# Patient Record
Sex: FEMALE | Race: WHITE | NOT HISPANIC OR LATINO
[De-identification: names, ages, dates, MRNs, and addresses within clinical notes are randomized per-mention and may not be internally consistent; named-entity substitution may affect disease eponyms.]

---

## 2017-01-31 ENCOUNTER — RESULT REVIEW (OUTPATIENT)
Age: 57
End: 2017-01-31

## 2017-02-14 ENCOUNTER — APPOINTMENT (OUTPATIENT)
Dept: OBGYN | Facility: CLINIC | Age: 57
End: 2017-02-14

## 2017-03-16 ENCOUNTER — APPOINTMENT (OUTPATIENT)
Dept: PULMONOLOGY | Facility: CLINIC | Age: 57
End: 2017-03-16

## 2017-03-16 VITALS
HEIGHT: 65 IN | BODY MASS INDEX: 31.16 KG/M2 | TEMPERATURE: 97.9 F | HEART RATE: 95 BPM | OXYGEN SATURATION: 96 % | WEIGHT: 187 LBS | RESPIRATION RATE: 16 BRPM

## 2017-05-18 ENCOUNTER — OUTPATIENT (OUTPATIENT)
Dept: OUTPATIENT SERVICES | Facility: HOSPITAL | Age: 57
LOS: 1 days | End: 2017-05-18
Payer: COMMERCIAL

## 2017-05-18 ENCOUNTER — APPOINTMENT (OUTPATIENT)
Dept: SLEEP CENTER | Facility: CLINIC | Age: 57
End: 2017-05-18

## 2017-05-18 PROCEDURE — 95810 POLYSOM 6/> YRS 4/> PARAM: CPT

## 2017-05-19 DIAGNOSIS — G47.33 OBSTRUCTIVE SLEEP APNEA (ADULT) (PEDIATRIC): ICD-10-CM

## 2017-06-08 ENCOUNTER — RESULT REVIEW (OUTPATIENT)
Age: 57
End: 2017-06-08

## 2017-06-16 ENCOUNTER — APPOINTMENT (OUTPATIENT)
Dept: PULMONOLOGY | Facility: CLINIC | Age: 57
End: 2017-06-16

## 2017-06-16 VITALS
RESPIRATION RATE: 14 BRPM | TEMPERATURE: 97.8 F | HEART RATE: 87 BPM | HEIGHT: 65 IN | WEIGHT: 196 LBS | OXYGEN SATURATION: 97 % | BODY MASS INDEX: 32.65 KG/M2

## 2017-06-16 DIAGNOSIS — F51.12 INSUFFICIENT SLEEP SYNDROME: ICD-10-CM

## 2017-06-16 DIAGNOSIS — R06.83 SNORING: ICD-10-CM

## 2017-06-16 DIAGNOSIS — F19.982 OTHER PSYCHOACTIVE SUBSTANCE USE, UNSPECIFIED WITH PSYCHOACTIVE SUBSTANCE-INDUCED SLEEP DISORDER: ICD-10-CM

## 2017-06-16 RX ORDER — DOXYCYCLINE HYCLATE 100 MG/1
100 TABLET, DELAYED RELEASE ORAL
Qty: 14 | Refills: 0 | Status: COMPLETED | COMMUNITY
Start: 2017-04-10

## 2017-06-16 RX ORDER — ROSUVASTATIN CALCIUM 20 MG/1
20 TABLET, FILM COATED ORAL
Qty: 90 | Refills: 0 | Status: ACTIVE | COMMUNITY
Start: 2017-04-10

## 2017-06-16 RX ORDER — RAMIPRIL 10 MG/1
10 CAPSULE ORAL
Qty: 90 | Refills: 0 | Status: ACTIVE | COMMUNITY
Start: 2017-04-10

## 2017-06-16 RX ORDER — CLINDAMYCIN HYDROCHLORIDE 300 MG/1
300 CAPSULE ORAL
Qty: 28 | Refills: 0 | Status: COMPLETED | COMMUNITY
Start: 2017-03-25

## 2017-06-16 RX ORDER — OXYCODONE 5 MG/1
5 TABLET ORAL
Qty: 12 | Refills: 0 | Status: COMPLETED | COMMUNITY
Start: 2017-03-25

## 2017-11-15 ENCOUNTER — OUTPATIENT (OUTPATIENT)
Dept: OUTPATIENT SERVICES | Facility: HOSPITAL | Age: 57
LOS: 1 days | End: 2017-11-15
Payer: COMMERCIAL

## 2017-11-15 ENCOUNTER — APPOINTMENT (OUTPATIENT)
Dept: RADIOLOGY | Facility: CLINIC | Age: 57
End: 2017-11-15
Payer: COMMERCIAL

## 2017-11-15 DIAGNOSIS — Z00.8 ENCOUNTER FOR OTHER GENERAL EXAMINATION: ICD-10-CM

## 2017-11-15 PROCEDURE — 73562 X-RAY EXAM OF KNEE 3: CPT | Mod: 26,50

## 2017-11-15 PROCEDURE — 73562 X-RAY EXAM OF KNEE 3: CPT

## 2018-10-29 ENCOUNTER — APPOINTMENT (OUTPATIENT)
Dept: ALLERGY | Facility: CLINIC | Age: 58
End: 2018-10-29
Payer: COMMERCIAL

## 2018-10-29 VITALS
SYSTOLIC BLOOD PRESSURE: 140 MMHG | HEIGHT: 65 IN | RESPIRATION RATE: 16 BRPM | DIASTOLIC BLOOD PRESSURE: 82 MMHG | BODY MASS INDEX: 30.82 KG/M2 | WEIGHT: 185 LBS | HEART RATE: 80 BPM

## 2018-10-29 PROCEDURE — 99204 OFFICE O/P NEW MOD 45 MIN: CPT | Mod: 25

## 2018-10-29 RX ORDER — LORATADINE 10 MG/1
TABLET ORAL
Refills: 0 | Status: DISCONTINUED | COMMUNITY
End: 2018-10-29

## 2018-10-29 RX ORDER — MUPIROCIN 2 G/100G
2 CREAM TOPICAL
Qty: 22 | Refills: 0 | Status: DISCONTINUED | COMMUNITY
Start: 2017-04-10 | End: 2018-10-29

## 2018-10-31 ENCOUNTER — OTHER (OUTPATIENT)
Age: 58
End: 2018-10-31

## 2018-11-01 PROBLEM — Z88.1 DRUG ALLERGY, ANTIBIOTIC: Status: ACTIVE | Noted: 2018-11-01

## 2018-11-04 DIAGNOSIS — Z88.1 ALLERGY STATUS TO OTHER ANTIBIOTIC AGENTS: ICD-10-CM

## 2018-11-06 ENCOUNTER — APPOINTMENT (OUTPATIENT)
Dept: ALLERGY | Facility: CLINIC | Age: 58
End: 2018-11-06
Payer: COMMERCIAL

## 2018-11-06 PROCEDURE — 95076 INGEST CHALLENGE INI 120 MIN: CPT

## 2018-11-27 ENCOUNTER — APPOINTMENT (OUTPATIENT)
Dept: ALLERGY | Facility: CLINIC | Age: 58
End: 2018-11-27
Payer: COMMERCIAL

## 2018-11-27 PROCEDURE — 95076 INGEST CHALLENGE INI 120 MIN: CPT

## 2018-12-03 ENCOUNTER — OTHER (OUTPATIENT)
Age: 58
End: 2018-12-03

## 2018-12-06 ENCOUNTER — APPOINTMENT (OUTPATIENT)
Dept: ALLERGY | Facility: CLINIC | Age: 58
End: 2018-12-06
Payer: COMMERCIAL

## 2018-12-06 PROCEDURE — 95076 INGEST CHALLENGE INI 120 MIN: CPT

## 2019-09-25 ENCOUNTER — RESULT REVIEW (OUTPATIENT)
Age: 59
End: 2019-09-25

## 2020-11-04 ENCOUNTER — APPOINTMENT (OUTPATIENT)
Dept: ORTHOPEDIC SURGERY | Facility: CLINIC | Age: 60
End: 2020-11-04
Payer: COMMERCIAL

## 2020-11-04 VITALS
BODY MASS INDEX: 28.66 KG/M2 | DIASTOLIC BLOOD PRESSURE: 84 MMHG | SYSTOLIC BLOOD PRESSURE: 150 MMHG | HEART RATE: 90 BPM | WEIGHT: 172 LBS | HEIGHT: 65 IN

## 2020-11-04 VITALS — TEMPERATURE: 96.5 F

## 2020-11-04 DIAGNOSIS — G47.33 OBSTRUCTIVE SLEEP APNEA (ADULT) (PEDIATRIC): ICD-10-CM

## 2020-11-04 DIAGNOSIS — Z86.69 PERSONAL HISTORY OF OTHER DISEASES OF THE NERVOUS SYSTEM AND SENSE ORGANS: ICD-10-CM

## 2020-11-04 PROCEDURE — 99072 ADDL SUPL MATRL&STAF TM PHE: CPT

## 2020-11-04 PROCEDURE — 99204 OFFICE O/P NEW MOD 45 MIN: CPT

## 2020-11-04 PROCEDURE — 73564 X-RAY EXAM KNEE 4 OR MORE: CPT | Mod: RT

## 2020-12-03 ENCOUNTER — OUTPATIENT (OUTPATIENT)
Dept: OUTPATIENT SERVICES | Facility: HOSPITAL | Age: 60
LOS: 1 days | Discharge: HOME | End: 2020-12-03

## 2020-12-03 DIAGNOSIS — M17.12 UNILATERAL PRIMARY OSTEOARTHRITIS, LEFT KNEE: ICD-10-CM

## 2020-12-03 DIAGNOSIS — Z01.818 ENCOUNTER FOR OTHER PREPROCEDURAL EXAMINATION: ICD-10-CM

## 2020-12-03 DIAGNOSIS — Z91.89 OTHER SPECIFIED PERSONAL RISK FACTORS, NOT ELSEWHERE CLASSIFIED: ICD-10-CM

## 2020-12-03 DIAGNOSIS — Z90.49 ACQUIRED ABSENCE OF OTHER SPECIFIED PARTS OF DIGESTIVE TRACT: Chronic | ICD-10-CM

## 2020-12-03 DIAGNOSIS — Z90.13 ACQUIRED ABSENCE OF BILATERAL BREASTS AND NIPPLES: Chronic | ICD-10-CM

## 2020-12-03 DIAGNOSIS — Z90.89 ACQUIRED ABSENCE OF OTHER ORGANS: Chronic | ICD-10-CM

## 2020-12-03 DIAGNOSIS — Z98.890 OTHER SPECIFIED POSTPROCEDURAL STATES: Chronic | ICD-10-CM

## 2020-12-03 LAB
A1C WITH ESTIMATED AVERAGE GLUCOSE RESULT: 5.8 % — HIGH (ref 4–5.6)
ANION GAP SERPL CALC-SCNC: 5 MMOL/L — SIGNIFICANT CHANGE UP (ref 5–17)
APTT BLD: 35.3 SEC — SIGNIFICANT CHANGE UP (ref 27.5–35.5)
BLD GP AB SCN SERPL QL: SIGNIFICANT CHANGE UP
BUN SERPL-MCNC: 29 MG/DL — HIGH (ref 7–23)
CALCIUM SERPL-MCNC: 9.4 MG/DL — SIGNIFICANT CHANGE UP (ref 8.5–10.1)
CHLORIDE SERPL-SCNC: 111 MMOL/L — HIGH (ref 96–108)
CO2 SERPL-SCNC: 28 MMOL/L — SIGNIFICANT CHANGE UP (ref 22–31)
CREAT SERPL-MCNC: 0.78 MG/DL — SIGNIFICANT CHANGE UP (ref 0.5–1.3)
ESTIMATED AVERAGE GLUCOSE: 120 MG/DL — HIGH (ref 68–114)
GLUCOSE SERPL-MCNC: 114 MG/DL — HIGH (ref 70–99)
HCT VFR BLD CALC: 45 % — SIGNIFICANT CHANGE UP (ref 34.5–45)
HGB BLD-MCNC: 14 G/DL — SIGNIFICANT CHANGE UP (ref 11.5–15.5)
INR BLD: 1.03 RATIO — SIGNIFICANT CHANGE UP (ref 0.88–1.16)
MCHC RBC-ENTMCNC: 27.2 PG — SIGNIFICANT CHANGE UP (ref 27–34)
MCHC RBC-ENTMCNC: 31.1 GM/DL — LOW (ref 32–36)
MCV RBC AUTO: 87.4 FL — SIGNIFICANT CHANGE UP (ref 80–100)
MRSA PCR RESULT.: SIGNIFICANT CHANGE UP
NRBC # BLD: 0 /100 WBCS — SIGNIFICANT CHANGE UP (ref 0–0)
PLATELET # BLD AUTO: 265 K/UL — SIGNIFICANT CHANGE UP (ref 150–400)
POTASSIUM SERPL-MCNC: 4.7 MMOL/L — SIGNIFICANT CHANGE UP (ref 3.5–5.3)
POTASSIUM SERPL-SCNC: 4.7 MMOL/L — SIGNIFICANT CHANGE UP (ref 3.5–5.3)
PROTHROM AB SERPL-ACNC: 11.9 SEC — SIGNIFICANT CHANGE UP (ref 10.6–13.6)
RBC # BLD: 5.15 M/UL — SIGNIFICANT CHANGE UP (ref 3.8–5.2)
RBC # FLD: 14.7 % — HIGH (ref 10.3–14.5)
S AUREUS DNA NOSE QL NAA+PROBE: SIGNIFICANT CHANGE UP
SODIUM SERPL-SCNC: 144 MMOL/L — SIGNIFICANT CHANGE UP (ref 135–145)
WBC # BLD: 6.99 K/UL — SIGNIFICANT CHANGE UP (ref 3.8–10.5)
WBC # FLD AUTO: 6.99 K/UL — SIGNIFICANT CHANGE UP (ref 3.8–10.5)

## 2020-12-03 RX ORDER — SODIUM CHLORIDE 9 MG/ML
3 INJECTION INTRAMUSCULAR; INTRAVENOUS; SUBCUTANEOUS EVERY 8 HOURS
Refills: 0 | Status: DISCONTINUED | OUTPATIENT
Start: 2020-12-18 | End: 2020-12-19

## 2020-12-03 NOTE — PHYSICAL THERAPY INITIAL EVALUATION ADULT - PERTINENT HX OF CURRENT PROBLEM, REHAB EVAL
Pt with chronic pain, instability, and reduced functional mobility in L knee. Pt is scheduled for elective total joint replacement on 12/18/20  by  Dr. Antonio

## 2020-12-03 NOTE — PHYSICAL THERAPY INITIAL EVALUATION ADULT - OCCUPATION
[employer].  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

## 2020-12-03 NOTE — H&P PST ADULT - HISTORY OF PRESENT ILLNESS
Pt is a 60 year old female presenting for knee pain.    Patient denies fever, chills, SOB, recent travel, and recent sick contacts. Pt is a 60 year old female presenting for left knee pain.  Pt reports pain is 10/10 with activity with stiffness.  Jaydon numbness or tingling.  Pt is scheduled for left total knee arthroplasty on 12/18/2020.    Patient denies fever, chills, SOB, recent travel, and recent sick contacts.

## 2020-12-03 NOTE — H&P PST ADULT - MUSCULOSKELETAL
details… detailed exam Left knee/decreased ROM/decreased ROM due to pain/joint swelling/diminished strength
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
## 2020-12-03 NOTE — PHYSICAL THERAPY INITIAL EVALUATION ADULT - TINETTI GAIT TEST, REHAB EVAL
Indication of gait - 1/1,   Step Length and height - 1/2,   Foot Clearance - 2/2,   Step Symmetry - 0/1,  Step Continuity - 1/1,   Path - 1/ 2,   Trunk - 1/2,   Walking Time - 0/1,   Total Score - 7/12

## 2020-12-03 NOTE — PHYSICAL THERAPY INITIAL EVALUATION ADULT - ADDITIONAL COMMENTS
Pt lives in a private home c 2 steps, no rails, then 5 steps Alcides rails far apart and platform step at the top that can fit a RW to enter the house. Inside pt has 5 steps to negotiate with L rail up. BR has tub-shower, grab bar, retractable shower head, standard toilet seat height that can fit 3:1 commode. 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 Average pain level at rest is 1/10. Pain increases to 10/10 c sit to stand transfer, prolonged standing, ambulation, and stairs negotiation. Pt denies taking pain meds and denies use of ice/heat. Denies h/o adverse reactions to pain meds. Pt is currently not receiving outpatient PT, denies h/o falls, reports infrequent buckling in the knee. Wears eyeglasses for reading and distance, is R handed, currently drives, denies use of hearing aids. 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 Pt lives in a private home c 2 small steps, no rails but can fit a rolling walker (Pt reported that she was able to safelt negotiate the 2 small steps c the rolling walker I), then 5 steps Alcides rails far apart and a platform step at the top that can fit a RW to enter the house. Inside pt has 5 steps to negotiate with L rail up. BR has tub-shower, grab bar, retractable shower head, standard toilet seat height that can fit 3:1 commode. 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 Average pain level at rest is 1/10. Pain increases to 10/10 c sit to stand transfer, prolonged standing, ambulation, and stairs negotiation. Pt denies taking pain meds and denies use of ice/heat. Denies h/o adverse reactions to pain meds. Pt is currently not receiving outpatient PT, denies h/o falls, reports infrequent buckling in the knee. Wears eyeglasses for reading and distance, is R handed, currently drives, denies use of hearing aids.

## 2020-12-03 NOTE — H&P PST ADULT - ASSESSMENT
CAPRINI SCORE [CLOT]    AGE RELATED RISK FACTORS                                                       MOBILITY RELATED FACTORS  [ ] Age 41-60 years                                            (1 Point)                  [ ] Bed rest                                                        (1 Point)  [ ] Age: 61-74 years                                           (2 Points)                 [ ] Plaster cast                                                   (2 Points)  [ ] Age= 75 years                                              (3 Points)                 [ ] Bed bound for more than 72 hours                 (2 Points)    DISEASE RELATED RISK FACTORS                                               GENDER SPECIFIC FACTORS  [ ] Edema in the lower extremities                       (1 Point)                  [ ] Pregnancy                                                     (1 Point)  [ ] Varicose veins                                               (1 Point)                  [ ] Post-partum < 6 weeks                                   (1 Point)             [ ] BMI > 25 Kg/m2                                            (1 Point)                  [ ] Hormonal therapy  or oral contraception          (1 Point)                 [ ] Sepsis (in the previous month)                        (1 Point)                  [ ] History of pregnancy complications                 (1 point)  [ ] Pneumonia or serious lung disease                                               [ ] Unexplained or recurrent                     (1 Point)           (in the previous month)                               (1 Point)  [ ] Abnormal pulmonary function test                     (1 Point)                 SURGERY RELATED RISK FACTORS  [ ] Acute myocardial infarction                              (1 Point)                 [ ]  Section                                             (1 Point)  [ ] Congestive heart failure (in the previous month)  (1 Point)               [ ] Minor surgery                                                  (1 Point)   [ ] Inflammatory bowel disease                             (1 Point)                 [ ] Arthroscopic surgery                                        (2 Points)  [ ] Central venous access                                      (2 Points)                [ ] General surgery lasting more than 45 minutes   (2 Points)       [ ] Stroke (in the previous month)                          (5 Points)               [ ] Elective arthroplasty                                         (5 Points)                                                                                                                                               HEMATOLOGY RELATED FACTORS                                                 TRAUMA RELATED RISK FACTORS  [ ] Prior episodes of VTE                                     (3 Points)                [ ] Fracture of the hip, pelvis, or leg                       (5 Points)  [ ] Positive family history for VTE                         (3 Points)                 [ ] Acute spinal cord injury (in the previous month)  (5 Points)  [ ] Prothrombin 07401 A                                     (3 Points)                 [ ] Paralysis  (less than 1 month)                             (5 Points)  [ ] Factor V Leiden                                             (3 Points)                  [ ] Multiple Trauma within 1 month                        (5 Points)  [ ] Lupus anticoagulants                                     (3 Points)                                                           [ ] Anticardiolipin antibodies                               (3 Points)                                                       [ ] High homocysteine in the blood                      (3 Points)                                             [ ] Other congenital or acquired thrombophilia      (3 Points)                                                [ ] Heparin induced thrombocytopenia                  (3 Points)                                          Total Score [          ]    Caprini Score 0 - 2:  Low Risk, No VTE Prophylaxis required for most patients, encourage ambulation  Caprini Score 3 - 6:  At Risk, pharmacologic VTE prophylaxis is indicated for most patients (in the absence of a contraindication)  Caprini Score Greater than or = 7:  High Risk, pharmacologic VTE prophylaxis is indicated for most patients (in the absence of a contraindication)      Preoperative examination  Assessment and Plan: labs - CBC, BMP, PT/INR,  aPTT, T & S, HgA1C, EKG, PT/ OT consult  Preop 3 day Hibiclens instruction reviewed and given.  Instructed on if nose cx is positive to use mupuricin for 5 days and checklist given.   Patient instructed of NPO status and to take routine meds DOS with sips of water.   Avoid NSAIDs, aspirin, herbal products, OTC supplements.  Patient verbalized understanding.  Information on proper nutrition, increase protein and better food choices provided in packet.  Patient to go for COVID swabbing at 3 days prior to surgery.  List of location and contact information provided.  Patient to schedule an appointment with Dr. Elpidio Avilez for medical clearance. Pt is a 60 year old female presenting for left knee pain.  Pt reports pain is 10/10 with activity with stiffness.  Jaydon numbness or tingling.    Plan: Left total knee arthroplasty on 2020.    CAPRINI SCORE [CLOT]    AGE RELATED RISK FACTORS                                                       MOBILITY RELATED FACTORS  [ ] Age 41-60 years                                            (1 Point)                  [ ] Bed rest                                                        (1 Point)  [x ] Age: 61-74 years                                           (2 Points)                 [ ] Plaster cast                                                   (2 Points)  [ ] Age= 75 years                                              (3 Points)                 [ ] Bed bound for more than 72 hours                 (2 Points)    DISEASE RELATED RISK FACTORS                                               GENDER SPECIFIC FACTORS  [ ] Edema in the lower extremities                       (1 Point)                  [ ] Pregnancy                                                     (1 Point)  [ ] Varicose veins                                               (1 Point)                  [ ] Post-partum < 6 weeks                                   (1 Point)             [x ] BMI > 25 Kg/m2                                            (1 Point)                  [ ] Hormonal therapy  or oral contraception          (1 Point)                 [ ] Sepsis (in the previous month)                        (1 Point)                  [ ] History of pregnancy complications                 (1 point)  [ ] Pneumonia or serious lung disease                                               [ ] Unexplained or recurrent                     (1 Point)           (in the previous month)                               (1 Point)  [ ] Abnormal pulmonary function test                     (1 Point)                 SURGERY RELATED RISK FACTORS  [ ] Acute myocardial infarction                              (1 Point)                 [ ]  Section                                             (1 Point)  [ ] Congestive heart failure (in the previous month)  (1 Point)               [ ] Minor surgery                                                  (1 Point)   [ ] Inflammatory bowel disease                             (1 Point)                 [ ] Arthroscopic surgery                                        (2 Points)  [ ] Central venous access                                      (2 Points)                [ ] General surgery lasting more than 45 minutes   (2 Points)       [ ] Stroke (in the previous month)                          (5 Points)               [x ] Elective arthroplasty                                         (5 Points)                                                                                                                                               HEMATOLOGY RELATED FACTORS                                                 TRAUMA RELATED RISK FACTORS  [ ] Prior episodes of VTE                                     (3 Points)                [ ] Fracture of the hip, pelvis, or leg                       (5 Points)  [ ] Positive family history for VTE                         (3 Points)                 [ ] Acute spinal cord injury (in the previous month)  (5 Points)  [ ] Prothrombin 52898 A                                     (3 Points)                 [ ] Paralysis  (less than 1 month)                             (5 Points)  [ ] Factor V Leiden                                             (3 Points)                  [ ] Multiple Trauma within 1 month                        (5 Points)  [ ] Lupus anticoagulants                                     (3 Points)                                                           [ ] Anticardiolipin antibodies                               (3 Points)                                                       [ ] High homocysteine in the blood                      (3 Points)                                             [ ] Other congenital or acquired thrombophilia      (3 Points)                                                [ ] Heparin induced thrombocytopenia                  (3 Points)                                          Total Score [    8     ]    Caprini Score 0 - 2:  Low Risk, No VTE Prophylaxis required for most patients, encourage ambulation  Caprini Score 3 - 6:  At Risk, pharmacologic VTE prophylaxis is indicated for most patients (in the absence of a contraindication)  Caprini Score Greater than or = 7:  High Risk, pharmacologic VTE prophylaxis is indicated for most patients (in the absence of a contraindication)    Caprini score indicates that the patient is high risk for VTE event ( score 6 or greater). Surgical patient's in this group will benefit from both pharmacologic prophylaxis and intermittent compression devices . Surgical team will determine the balance between VTE  risk and bleeding risk and other clinical considerations       Preoperative examination  Assessment and Plan: labs - CBC, BMP, PT/INR,  aPTT, T & S, HgA1C, EKG, PT/ OT consult  Preop 3 day Hibiclens instruction reviewed and given.  Instructed on if nose cx is positive to use mupuricin for 5 days and checklist given.   Patient instructed of NPO status and to take routine meds DOS with sips of water.   Avoid NSAIDs, aspirin, herbal products, OTC supplements.  Patient verbalized understanding.  Information on proper nutrition, increase protein and better food choices provided in packet.  Patient to go for COVID swabbing at 3 days prior to surgery.  List of location and contact information provided.  Patient to schedule an appointment with Dr. Elpidio Avilez for medical clearance.

## 2020-12-03 NOTE — H&P PST ADULT - NSICDXPASTMEDICALHX_GEN_ALL_CORE_FT
PAST MEDICAL HISTORY:  Lymphedema      PAST MEDICAL HISTORY:  Breast CA     HTN (hypertension)     Lymphedema      PAST MEDICAL HISTORY:  Breast CA     High cholesterol     HTN (hypertension)     Lymphedema

## 2020-12-03 NOTE — H&P PST ADULT - NSICDXPROBLEM_GEN_ALL_CORE_FT
PROBLEM DIAGNOSES  Problem: Preop examination  Assessment and Plan:        PROBLEM DIAGNOSES  Problem: Primary osteoarthritis of left knee  Assessment and Plan: Left total knee arthroplasty    Problem: At high risk for venous thromboembolism (VTE)  Assessment and Plan: Caprini score indicates that the patient is high risk for VTE event ( score 6 or greater). Surgical patient's in this group will benefit from both pharmacologic prophylaxis and intermittent compression devices . Surgical team will determine the balance between VTE  risk and bleeding risk and other clinical considerations       Problem: Preop examination  Assessment and Plan:        PROBLEM DIAGNOSES  Problem: Primary osteoarthritis of left knee  Assessment and Plan: Left total knee arthroplasty    Problem: At high risk for venous thromboembolism (VTE)  Assessment and Plan: Caprini score indicates that the patient is high risk for VTE event ( score 6 or greater). Surgical patient's in this group will benefit from both pharmacologic prophylaxis and intermittent compression devices . Surgical team will determine the balance between VTE  risk and bleeding risk and other clinical considerations       Problem: Preop examination  Assessment and Plan: CBC, BMP, PT/INR,  aPTT, T & S, HgA1C, EKG, PT/ OT consult  Preop 3 day Hibiclens instruction reviewed and given.  Instructed on if nose cx is positive to use mupuricin for 5 days and checklist given.   Patient instructed of NPO status and to take routine meds DOS with sips of water.   Avoid NSAIDs, aspirin, herbal products, OTC supplements.  Patient verbalized understanding.  Information on proper nutrition, increase protein and better food choices provided in packet.  Patient to go for COVID swabbing at 3 days prior to surgery.  List of location and contact information provided.  Patient to schedule an appointment with Dr. Elpidio Avilez for medical clearance.

## 2020-12-03 NOTE — PHYSICAL THERAPY INITIAL EVALUATION ADULT - MUSCLE TONE ASSESSMENT, REHAB EVAL
Arthropathy left lower leg    Benign Heart Murmur    CA - Cancer of Uterus  endometrial  Cerebrospinal Meningitis    Closed Fracture Ankle, Bimalleolar  right ankle cast immobilization  DD (Diverticular Disease)    Diabetes Mellitus Type II    History of Hemorrhoidectomy    Hyperlipemia    Lumbar Spinal Stenosis    Obesity    Osteoarthritis of Lumbar Spine    Renal Calculus    Vitreous Detachment  right eye 1990    left eye 1995 normal

## 2020-12-03 NOTE — PHYSICAL THERAPY INITIAL EVALUATION ADULT - CRITERIA FOR SKILLED THERAPEUTIC INTERVENTIONS
impairments found/rehab potential/anticipated discharge recommendation/functional limitations in following categories/therapy frequency/risk reduction/prevention/anticipated equipment needs at discharge

## 2020-12-03 NOTE — PHYSICAL THERAPY INITIAL EVALUATION ADULT - TINETTI BALANCE TEST, REHAB EVAL
Sitting Balance - 1/1 , Rises From Chair - 1/2, Attempts to rise - 2/2 , Immediate Standing Balance - 1/2,  Standing Balance - 2/2, Nudged -  0/2, Eyes Closed - 0/1,  Turning 360 Deg -  1/2, Sitting down - 1/2, Balance Score - 8/16

## 2020-12-03 NOTE — PHYSICAL THERAPY INITIAL EVALUATION ADULT - IMPAIRMENTS FOUND, PT EVAL
joint integrity and mobility/posture/aerobic capacity/endurance/muscle strength/ROM/gait, locomotion, and balance

## 2020-12-03 NOTE — H&P PST ADULT - NSICDXPASTSURGICALHX_GEN_ALL_CORE_FT
PAST SURGICAL HISTORY:  H/O rhinoplasty     History of appendectomy     History of tonsillectomy      PAST SURGICAL HISTORY:  H/O bilateral mastectomy     H/O rhinoplasty     History of appendectomy     History of tonsillectomy

## 2020-12-03 NOTE — H&P PST ADULT - NSANTHOSAYNRD_GEN_A_CORE
No. SUBHA screening performed.  STOP BANG Legend: 0-2 = LOW Risk; 3-4 = INTERMEDIATE Risk; 5-8 = HIGH Risk Yes

## 2020-12-11 PROBLEM — I10 ESSENTIAL (PRIMARY) HYPERTENSION: Chronic | Status: ACTIVE | Noted: 2020-12-03

## 2020-12-12 ENCOUNTER — APPOINTMENT (OUTPATIENT)
Dept: CT IMAGING | Facility: IMAGING CENTER | Age: 60
End: 2020-12-12
Payer: COMMERCIAL

## 2020-12-12 ENCOUNTER — OUTPATIENT (OUTPATIENT)
Dept: OUTPATIENT SERVICES | Facility: HOSPITAL | Age: 60
LOS: 1 days | End: 2020-12-12
Payer: COMMERCIAL

## 2020-12-12 ENCOUNTER — RESULT REVIEW (OUTPATIENT)
Age: 60
End: 2020-12-12

## 2020-12-12 DIAGNOSIS — M17.12 UNILATERAL PRIMARY OSTEOARTHRITIS, LEFT KNEE: ICD-10-CM

## 2020-12-12 DIAGNOSIS — Z90.49 ACQUIRED ABSENCE OF OTHER SPECIFIED PARTS OF DIGESTIVE TRACT: Chronic | ICD-10-CM

## 2020-12-12 DIAGNOSIS — Z90.13 ACQUIRED ABSENCE OF BILATERAL BREASTS AND NIPPLES: Chronic | ICD-10-CM

## 2020-12-12 DIAGNOSIS — Z98.890 OTHER SPECIFIED POSTPROCEDURAL STATES: Chronic | ICD-10-CM

## 2020-12-12 DIAGNOSIS — Z90.89 ACQUIRED ABSENCE OF OTHER ORGANS: Chronic | ICD-10-CM

## 2020-12-12 PROCEDURE — 73700 CT LOWER EXTREMITY W/O DYE: CPT | Mod: 26,LT

## 2020-12-12 PROCEDURE — 73700 CT LOWER EXTREMITY W/O DYE: CPT

## 2020-12-15 ENCOUNTER — APPOINTMENT (OUTPATIENT)
Dept: DISASTER EMERGENCY | Facility: CLINIC | Age: 60
End: 2020-12-15

## 2020-12-17 ENCOUNTER — TRANSCRIPTION ENCOUNTER (OUTPATIENT)
Age: 60
End: 2020-12-17

## 2020-12-17 LAB — SARS-COV-2 N GENE NPH QL NAA+PROBE: NOT DETECTED

## 2020-12-18 ENCOUNTER — TRANSCRIPTION ENCOUNTER (OUTPATIENT)
Age: 60
End: 2020-12-18

## 2020-12-18 ENCOUNTER — APPOINTMENT (OUTPATIENT)
Dept: ORTHOPEDIC SURGERY | Facility: HOSPITAL | Age: 60
End: 2020-12-18

## 2020-12-18 ENCOUNTER — INPATIENT (INPATIENT)
Facility: HOSPITAL | Age: 60
LOS: 0 days | Discharge: HOME | End: 2020-12-19
Attending: ORTHOPAEDIC SURGERY | Admitting: ORTHOPAEDIC SURGERY
Payer: COMMERCIAL

## 2020-12-18 ENCOUNTER — RESULT REVIEW (OUTPATIENT)
Age: 60
End: 2020-12-18

## 2020-12-18 VITALS
DIASTOLIC BLOOD PRESSURE: 81 MMHG | OXYGEN SATURATION: 97 % | TEMPERATURE: 98 F | SYSTOLIC BLOOD PRESSURE: 142 MMHG | WEIGHT: 177.03 LBS | HEART RATE: 89 BPM | RESPIRATION RATE: 16 BRPM | HEIGHT: 65 IN

## 2020-12-18 DIAGNOSIS — Z90.89 ACQUIRED ABSENCE OF OTHER ORGANS: Chronic | ICD-10-CM

## 2020-12-18 DIAGNOSIS — Z98.890 OTHER SPECIFIED POSTPROCEDURAL STATES: Chronic | ICD-10-CM

## 2020-12-18 DIAGNOSIS — Z90.13 ACQUIRED ABSENCE OF BILATERAL BREASTS AND NIPPLES: Chronic | ICD-10-CM

## 2020-12-18 DIAGNOSIS — Z90.49 ACQUIRED ABSENCE OF OTHER SPECIFIED PARTS OF DIGESTIVE TRACT: Chronic | ICD-10-CM

## 2020-12-18 PROCEDURE — 88305 TISSUE EXAM BY PATHOLOGIST: CPT | Mod: 26

## 2020-12-18 PROCEDURE — 20985 CPTR-ASST DIR MS PX: CPT

## 2020-12-18 PROCEDURE — 73560 X-RAY EXAM OF KNEE 1 OR 2: CPT | Mod: 26,LT

## 2020-12-18 PROCEDURE — 27447 TOTAL KNEE ARTHROPLASTY: CPT | Mod: LT

## 2020-12-18 PROCEDURE — 88311 DECALCIFY TISSUE: CPT | Mod: 26

## 2020-12-18 RX ORDER — CEFAZOLIN SODIUM 1 G
2000 VIAL (EA) INJECTION EVERY 8 HOURS
Refills: 0 | Status: COMPLETED | OUTPATIENT
Start: 2020-12-18 | End: 2020-12-19

## 2020-12-18 RX ORDER — ONDANSETRON 8 MG/1
4 TABLET, FILM COATED ORAL EVERY 6 HOURS
Refills: 0 | Status: DISCONTINUED | OUTPATIENT
Start: 2020-12-18 | End: 2020-12-19

## 2020-12-18 RX ORDER — ACETAMINOPHEN 500 MG
100 TABLET ORAL ONCE
Refills: 0 | Status: DISCONTINUED | OUTPATIENT
Start: 2020-12-18 | End: 2020-12-19

## 2020-12-18 RX ORDER — SODIUM CHLORIDE 9 MG/ML
500 INJECTION, SOLUTION INTRAVENOUS ONCE
Refills: 0 | Status: COMPLETED | OUTPATIENT
Start: 2020-12-18 | End: 2020-12-18

## 2020-12-18 RX ORDER — FENTANYL CITRATE 50 UG/ML
25 INJECTION INTRAVENOUS
Refills: 0 | Status: DISCONTINUED | OUTPATIENT
Start: 2020-12-18 | End: 2020-12-18

## 2020-12-18 RX ORDER — OXYCODONE HYDROCHLORIDE 5 MG/1
10 TABLET ORAL
Refills: 0 | Status: DISCONTINUED | OUTPATIENT
Start: 2020-12-18 | End: 2020-12-19

## 2020-12-18 RX ORDER — SODIUM CHLORIDE 9 MG/ML
1000 INJECTION, SOLUTION INTRAVENOUS
Refills: 0 | Status: DISCONTINUED | OUTPATIENT
Start: 2020-12-18 | End: 2020-12-18

## 2020-12-18 RX ORDER — MAGNESIUM HYDROXIDE 400 MG/1
30 TABLET, CHEWABLE ORAL DAILY
Refills: 0 | Status: DISCONTINUED | OUTPATIENT
Start: 2020-12-18 | End: 2020-12-19

## 2020-12-18 RX ORDER — PANTOPRAZOLE SODIUM 20 MG/1
40 TABLET, DELAYED RELEASE ORAL
Refills: 0 | Status: DISCONTINUED | OUTPATIENT
Start: 2020-12-18 | End: 2020-12-19

## 2020-12-18 RX ORDER — ACETAMINOPHEN 500 MG
650 TABLET ORAL ONCE
Refills: 0 | Status: COMPLETED | OUTPATIENT
Start: 2020-12-18 | End: 2020-12-18

## 2020-12-18 RX ORDER — DEXAMETHASONE 0.5 MG/5ML
8 ELIXIR ORAL ONCE
Refills: 0 | Status: COMPLETED | OUTPATIENT
Start: 2020-12-19 | End: 2020-12-19

## 2020-12-18 RX ORDER — SODIUM CHLORIDE 9 MG/ML
500 INJECTION, SOLUTION INTRAVENOUS ONCE
Refills: 0 | Status: COMPLETED | OUTPATIENT
Start: 2020-12-19 | End: 2020-12-19

## 2020-12-18 RX ORDER — TRAMADOL HYDROCHLORIDE 50 MG/1
50 TABLET ORAL EVERY 6 HOURS
Refills: 0 | Status: DISCONTINUED | OUTPATIENT
Start: 2020-12-18 | End: 2020-12-19

## 2020-12-18 RX ORDER — HYDROMORPHONE HYDROCHLORIDE 2 MG/ML
0.5 INJECTION INTRAMUSCULAR; INTRAVENOUS; SUBCUTANEOUS ONCE
Refills: 0 | Status: DISCONTINUED | OUTPATIENT
Start: 2020-12-18 | End: 2020-12-19

## 2020-12-18 RX ORDER — SENNA PLUS 8.6 MG/1
2 TABLET ORAL AT BEDTIME
Refills: 0 | Status: DISCONTINUED | OUTPATIENT
Start: 2020-12-18 | End: 2020-12-19

## 2020-12-18 RX ORDER — ASCORBIC ACID 60 MG
500 TABLET,CHEWABLE ORAL
Refills: 0 | Status: DISCONTINUED | OUTPATIENT
Start: 2020-12-18 | End: 2020-12-19

## 2020-12-18 RX ORDER — OXYCODONE HYDROCHLORIDE 5 MG/1
5 TABLET ORAL ONCE
Refills: 0 | Status: DISCONTINUED | OUTPATIENT
Start: 2020-12-18 | End: 2020-12-18

## 2020-12-18 RX ORDER — METOCLOPRAMIDE HCL 10 MG
10 TABLET ORAL ONCE
Refills: 0 | Status: DISCONTINUED | OUTPATIENT
Start: 2020-12-18 | End: 2020-12-18

## 2020-12-18 RX ORDER — BENZOCAINE AND MENTHOL 5; 1 G/100ML; G/100ML
1 LIQUID ORAL
Refills: 0 | Status: DISCONTINUED | OUTPATIENT
Start: 2020-12-18 | End: 2020-12-19

## 2020-12-18 RX ORDER — FENTANYL CITRATE 50 UG/ML
50 INJECTION INTRAVENOUS
Refills: 0 | Status: DISCONTINUED | OUTPATIENT
Start: 2020-12-18 | End: 2020-12-18

## 2020-12-18 RX ORDER — OXYCODONE HYDROCHLORIDE 5 MG/1
5 TABLET ORAL
Refills: 0 | Status: DISCONTINUED | OUTPATIENT
Start: 2020-12-18 | End: 2020-12-19

## 2020-12-18 RX ORDER — CELECOXIB 200 MG/1
200 CAPSULE ORAL EVERY 12 HOURS
Refills: 0 | Status: DISCONTINUED | OUTPATIENT
Start: 2020-12-20 | End: 2020-12-19

## 2020-12-18 RX ORDER — APIXABAN 2.5 MG/1
2.5 TABLET, FILM COATED ORAL
Refills: 0 | Status: DISCONTINUED | OUTPATIENT
Start: 2020-12-19 | End: 2020-12-19

## 2020-12-18 RX ORDER — CELECOXIB 200 MG/1
200 CAPSULE ORAL ONCE
Refills: 0 | Status: COMPLETED | OUTPATIENT
Start: 2020-12-18 | End: 2020-12-18

## 2020-12-18 RX ORDER — LANOLIN ALCOHOL/MO/W.PET/CERES
3 CREAM (GRAM) TOPICAL AT BEDTIME
Refills: 0 | Status: DISCONTINUED | OUTPATIENT
Start: 2020-12-18 | End: 2020-12-19

## 2020-12-18 RX ORDER — KETOROLAC TROMETHAMINE 30 MG/ML
15 SYRINGE (ML) INJECTION EVERY 6 HOURS
Refills: 0 | Status: DISCONTINUED | OUTPATIENT
Start: 2020-12-18 | End: 2020-12-19

## 2020-12-18 RX ORDER — FOLIC ACID 0.8 MG
1 TABLET ORAL DAILY
Refills: 0 | Status: DISCONTINUED | OUTPATIENT
Start: 2020-12-18 | End: 2020-12-19

## 2020-12-18 RX ORDER — DIPHENHYDRAMINE HCL 50 MG
25 CAPSULE ORAL EVERY 6 HOURS
Refills: 0 | Status: DISCONTINUED | OUTPATIENT
Start: 2020-12-18 | End: 2020-12-19

## 2020-12-18 RX ORDER — POLYETHYLENE GLYCOL 3350 17 G/17G
17 POWDER, FOR SOLUTION ORAL AT BEDTIME
Refills: 0 | Status: DISCONTINUED | OUTPATIENT
Start: 2020-12-18 | End: 2020-12-19

## 2020-12-18 RX ORDER — ACETAMINOPHEN 500 MG
975 TABLET ORAL EVERY 8 HOURS
Refills: 0 | Status: DISCONTINUED | OUTPATIENT
Start: 2020-12-19 | End: 2020-12-19

## 2020-12-18 RX ADMIN — POLYETHYLENE GLYCOL 3350 17 GRAM(S): 17 POWDER, FOR SOLUTION ORAL at 21:22

## 2020-12-18 RX ADMIN — SODIUM CHLORIDE 500 MILLILITER(S): 9 INJECTION, SOLUTION INTRAVENOUS at 16:58

## 2020-12-18 RX ADMIN — OXYCODONE HYDROCHLORIDE 10 MILLIGRAM(S): 5 TABLET ORAL at 20:20

## 2020-12-18 RX ADMIN — SENNA PLUS 2 TABLET(S): 8.6 TABLET ORAL at 21:21

## 2020-12-18 RX ADMIN — Medication 500 MILLIGRAM(S): at 21:21

## 2020-12-18 RX ADMIN — CELECOXIB 200 MILLIGRAM(S): 200 CAPSULE ORAL at 12:48

## 2020-12-18 RX ADMIN — Medication 100 MILLIGRAM(S): at 21:21

## 2020-12-18 RX ADMIN — SODIUM CHLORIDE 500 MILLILITER(S): 9 INJECTION, SOLUTION INTRAVENOUS at 18:30

## 2020-12-18 RX ADMIN — SODIUM CHLORIDE 3 MILLILITER(S): 9 INJECTION INTRAMUSCULAR; INTRAVENOUS; SUBCUTANEOUS at 23:30

## 2020-12-18 RX ADMIN — Medication 650 MILLIGRAM(S): at 13:01

## 2020-12-18 RX ADMIN — OXYCODONE HYDROCHLORIDE 10 MILLIGRAM(S): 5 TABLET ORAL at 23:31

## 2020-12-18 RX ADMIN — CELECOXIB 200 MILLIGRAM(S): 200 CAPSULE ORAL at 13:02

## 2020-12-18 RX ADMIN — Medication 1 MILLIGRAM(S): at 21:21

## 2020-12-18 RX ADMIN — Medication 1 TABLET(S): at 21:21

## 2020-12-18 RX ADMIN — Medication 650 MILLIGRAM(S): at 12:48

## 2020-12-18 NOTE — OCCUPATIONAL THERAPY INITIAL EVALUATION ADULT - RANGE OF MOTION EXAMINATION, LOWER EXTREMITY
Left knee AROM grossly decreased s/p left TKA/Right LE Active ROM was WNL(within normal limits)/Right LE Passive ROM was WNL (within normal limits)

## 2020-12-18 NOTE — PHYSICAL THERAPY INITIAL EVALUATION ADULT - GAIT DISTANCE, PT EVAL
[facility] 1st Floor Neurology    [address]    [address]    [facility] [address]    Phone:  [phone]    Fax:  [phone]       Thank You for choosing us for your health care visit. We are glad to serve you and happy to provide you with this summary of your visit. Please help us to ensure we have accurate records. If you find anything that needs to be changed, please let our staff know as soon as possible.          Your Demographic Information     Patient Name Sex     Malika Hui Male 2002       Ethnic Group Patient Race    Not of  or  Origin Black/      Your Visit Details     Date & Time Provider Department    2017 9:30 AM MAYA Fernandes G [facility] 1st Floor Neurology      Your Upcoming Appointment*(Max 10)       8:00 AM CDT   Behavioral Health New Patient with Shirley Kirk, HILARIO   [facility] ([facility])    [address] [facility] [address]   [phone]            Thursday May 18, 2017  9:30 AM CDT   Follow-up Visit with MAYA Mcdermott [facility] 1st Floor Neurology ([facility] OFF[facility])    [address]  [address]  [facility] [address] [phone]              Your To Do List     Follow-Up    Return in about 6 weeks (around 2017), or if symptoms worsen or fail to improve, for Headaches.      Your Vitals Were     BP Pulse Height Weight BMI Smoking Status    106/72 (11 %/ 63 %)* 64 5' 9\" (1.753 m) (50 %, Z= -0.01)† 182 lb 3.2 oz (82.6 kg) (91 %, Z= 1.32)† 26.91 kg/m2 (92 %, Z= 1.42)† Never Smoker    *BP percentiles are based on NHBPEP's 4th Report    †Growth percentiles are based on CDC 2-20 Years data.      Medications Prescribed or Re-Ordered Today     topiramate (TOPAMAX) 25 MG tablet    Si po qhs x1week, 2 po qhs x1week, 3 po qhs x1 week then 4 po qhs thereafter.    Class: Eprescribe    Pharmacy: [id number] -  [place], [place] - [address] #: [phone]      Your Current Medications Are        Disp Refills Start End    amphetamine-dextroamphetamine XR (ADDERALL XR) 15 MG 24 hr capsule 30 capsule 0 3/22/2017     Sig - Route: Take 1 capsule by mouth daily. - Oral    Cosign for Ordering: Accepted by Steph Pompa MD on 3/22/2017  2:07 PM    guanFACINE (INTUNIV) 3 MG TABLET SR 24 HR 30 tablet 0 2017     Sig - Route: Take 1 tablet by mouth nightly. - Oral    Cosign for Ordering: Accepted by Alphonso Cabello MD on 2017 11:53 AM    topiramate (TOPAMAX) 25 MG tablet 120 tablet 1 2017     Si po qhs x1week, 2 po qhs x1week, 3 po qhs x1 week then 4 po qhs thereafter.    Class: Eprescribe      Allergies     No Known Allergies      Immunizations History as of 2017     Name Date    DTaP 3/23/2005, 2001, 2000, 2000, 2000    HEPATITIS A CHILD 2015, 2013    HIB 2001, 2000, 2000, 2000    Hepatitis B Child 2000, 2000, 2000    MMR 2001    Meningococcal Conjugate MCV4P (Menactra) 2011    Pneumococcal Conjugate 13 Valent 3/23/2005, 10/31/2001, 2001, 2000    Polio, INACTIVATED 2000, 2000    Polio, ORAL 3/23/2005, 2001    Tdap 2011    Varicella 2011, 2001      Problem List as of 2017     HA (headache)    Chronic migraine without aura without status migrainosus, not intractable    ADD (attention deficit disorder) without hyperactivity              Patient Instructions    Please take time to complete a survey if you receive one by mail. Your feedback is very helpful.    1. Magnesium 400mg twice a day.     2. Make sure drinking at least 64 ounces of water per day.     3. Keep headache diary and bring to follow up appointment.     4. Start Topamax per instructions on the bottle.     5. Reconsider Adderall as this can cause headaches.             125 feet

## 2020-12-18 NOTE — PHYSICAL THERAPY INITIAL EVALUATION ADULT - OCCUPATION
[employer].  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

## 2020-12-18 NOTE — PHYSICAL THERAPY INITIAL EVALUATION ADULT - IMPAIRMENTS FOUND, PT EVAL
aerobic capacity/endurance/gait, locomotion, and balance/joint integrity and mobility/muscle strength/posture/ROM

## 2020-12-18 NOTE — OCCUPATIONAL THERAPY INITIAL EVALUATION ADULT - RANGE OF MOTION, PT EVAL
Patient will increase AROM in left knee to WFL in order to perform toilet transfer independently within 3 weeks

## 2020-12-18 NOTE — PHYSICAL THERAPY INITIAL EVALUATION ADULT - GAIT DEVIATIONS NOTED, PT EVAL
decreased faraz/decreased step length/decreased stride length/increased stride width/decreased weight-shifting ability

## 2020-12-18 NOTE — PHYSICAL THERAPY INITIAL EVALUATION ADULT - ACTIVE RANGE OF MOTION EXAMINATION, REHAB EVAL
L knee 5-85/bilateral upper extremity Active ROM was WFL (within functional limits)/bilateral  lower extremity Active ROM was WFL (within functional limits)/deficits as listed below

## 2020-12-18 NOTE — PHYSICAL THERAPY INITIAL EVALUATION ADULT - DIAGNOSIS, PT EVAL
Pt spouse calling  pt still complaining about intermittent chest pain.  has seen PMD for this and did have a stress test, blood work and chest xray. All negative.   States same pain but not getting any better. Would like to see Dr Wright for second opinion.     Intermittent  states happening more often.       SOB? no  Dizziness or weakness? Has been experiencing dizziness   Cool, moist skin?no  Nausea or vomiting?no  Pain in the neck, shoulders, jaw, back, or arms? Tingling down left arm. Left shoulder.  Blue or gray face, lips, earlobes, or fingernails? no  Heart palpitations?no      Change in chest pain pattern in known cardiac patient? NA    Chest pain at rest or that awakens patient? yes  Family hx of heart disease? yes  Over 30 and heavy smoker with HTN, high cholesterol? Yes Blood pressure but not on medication  Coughing up blood? Coughs a lot no blood  Pain in legs, swelling, warmth, redness?no    Advised to go to ER.       
difficulty in walking

## 2020-12-18 NOTE — PHYSICAL THERAPY INITIAL EVALUATION ADULT - ADDITIONAL COMMENTS
As per pre-op and reviewed with patient POD #0: Pt lives in a private home c 2 small steps, no rails but can fit a rolling walker (Pt reported that she was able to safely negotiate the 2 small steps c the rolling walker I), then 5 steps R rail up and a platform step at the top that can fit a RW to enter the house. Inside pt has 5 steps to negotiate with L rail up. BR has tub-shower, grab bar, retractable shower head, standard toilet seat height that can fit 3:1 commode. Pt owns [employer] and uses for all activities. Average pain level at rest is 1/10. Pain increases to 10/10 c sit to stand transfer, prolonged standing, ambulation, and stairs negotiation. Pt denies taking pain meds and denies use of ice/heat. Denies h/o adverse reactions to pain meds. Pt is currently not receiving outpatient PT, denies h/o falls, reports infrequent buckling in the knee. Wears eyeglasses for reading and distance, is R handed, currently drives, denies use of hearing aids.

## 2020-12-18 NOTE — PHYSICAL THERAPY INITIAL EVALUATION ADULT - GAIT TRAINING, PT EVAL
Patient will ambulate 500 feet with rolling walker independently for community ambulation in 2-3 days. Patient will ascend/descend 10 steps with R rail up/L rail down and straight cane and 3 steps with rolling walker independently in 2-3 days to safely navigate home environment.
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
## 2020-12-18 NOTE — OCCUPATIONAL THERAPY INITIAL EVALUATION ADULT - GENERAL OBSERVATIONS, REHAB EVAL
Chart reviewed and event noted to date. Patient encountered supine in bed, NAD, AXO@4, +IV heplock, +dressing on left knee C/D/I, WBAT LLE, c/o pain in left knee 3/10 at rest and 4/10 with movement s/p left TKA cooperative and pleasant with PT Bhumi beside.

## 2020-12-19 ENCOUNTER — TRANSCRIPTION ENCOUNTER (OUTPATIENT)
Age: 60
End: 2020-12-19

## 2020-12-19 VITALS
SYSTOLIC BLOOD PRESSURE: 134 MMHG | RESPIRATION RATE: 18 BRPM | OXYGEN SATURATION: 95 % | DIASTOLIC BLOOD PRESSURE: 73 MMHG | HEART RATE: 84 BPM | TEMPERATURE: 98 F

## 2020-12-19 LAB
ANION GAP SERPL CALC-SCNC: 8 MMOL/L — SIGNIFICANT CHANGE UP (ref 5–17)
BUN SERPL-MCNC: 20 MG/DL — SIGNIFICANT CHANGE UP (ref 7–23)
CALCIUM SERPL-MCNC: 9 MG/DL — SIGNIFICANT CHANGE UP (ref 8.5–10.1)
CHLORIDE SERPL-SCNC: 103 MMOL/L — SIGNIFICANT CHANGE UP (ref 96–108)
CO2 SERPL-SCNC: 26 MMOL/L — SIGNIFICANT CHANGE UP (ref 22–31)
CREAT SERPL-MCNC: 0.97 MG/DL — SIGNIFICANT CHANGE UP (ref 0.5–1.3)
GLUCOSE SERPL-MCNC: 217 MG/DL — HIGH (ref 70–99)
HCT VFR BLD CALC: 40.6 % — SIGNIFICANT CHANGE UP (ref 34.5–45)
HGB BLD-MCNC: 13 G/DL — SIGNIFICANT CHANGE UP (ref 11.5–15.5)
MCHC RBC-ENTMCNC: 27.8 PG — SIGNIFICANT CHANGE UP (ref 27–34)
MCHC RBC-ENTMCNC: 32 GM/DL — SIGNIFICANT CHANGE UP (ref 32–36)
MCV RBC AUTO: 86.8 FL — SIGNIFICANT CHANGE UP (ref 80–100)
NRBC # BLD: 0 /100 WBCS — SIGNIFICANT CHANGE UP (ref 0–0)
PLATELET # BLD AUTO: 238 K/UL — SIGNIFICANT CHANGE UP (ref 150–400)
POTASSIUM SERPL-MCNC: 4.2 MMOL/L — SIGNIFICANT CHANGE UP (ref 3.5–5.3)
POTASSIUM SERPL-SCNC: 4.2 MMOL/L — SIGNIFICANT CHANGE UP (ref 3.5–5.3)
RBC # BLD: 4.68 M/UL — SIGNIFICANT CHANGE UP (ref 3.8–5.2)
RBC # FLD: 13.7 % — SIGNIFICANT CHANGE UP (ref 10.3–14.5)
SODIUM SERPL-SCNC: 137 MMOL/L — SIGNIFICANT CHANGE UP (ref 135–145)
WBC # BLD: 18.62 K/UL — HIGH (ref 3.8–10.5)
WBC # FLD AUTO: 18.62 K/UL — HIGH (ref 3.8–10.5)

## 2020-12-19 RX ORDER — CELECOXIB 200 MG/1
1 CAPSULE ORAL
Qty: 60 | Refills: 0
Start: 2020-12-19 | End: 2021-01-17

## 2020-12-19 RX ORDER — OXYCODONE HYDROCHLORIDE 5 MG/1
1 TABLET ORAL
Qty: 20 | Refills: 0
Start: 2020-12-19 | End: 2020-12-23

## 2020-12-19 RX ORDER — APIXABAN 2.5 MG/1
1 TABLET, FILM COATED ORAL
Qty: 56 | Refills: 0
Start: 2020-12-19 | End: 2021-01-15

## 2020-12-19 RX ORDER — ACETAMINOPHEN 500 MG
3 TABLET ORAL
Qty: 270 | Refills: 0
Start: 2020-12-19 | End: 2021-01-17

## 2020-12-19 RX ORDER — OMEPRAZOLE 10 MG/1
1 CAPSULE, DELAYED RELEASE ORAL
Qty: 30 | Refills: 0
Start: 2020-12-19 | End: 2021-01-17

## 2020-12-19 RX ORDER — TRAMADOL HYDROCHLORIDE 50 MG/1
1 TABLET ORAL
Qty: 20 | Refills: 0
Start: 2020-12-19 | End: 2020-12-23

## 2020-12-19 RX ADMIN — Medication 1 MILLIGRAM(S): at 13:02

## 2020-12-19 RX ADMIN — Medication 15 MILLIGRAM(S): at 13:17

## 2020-12-19 RX ADMIN — Medication 975 MILLIGRAM(S): at 06:05

## 2020-12-19 RX ADMIN — Medication 1 TABLET(S): at 13:02

## 2020-12-19 RX ADMIN — OXYCODONE HYDROCHLORIDE 10 MILLIGRAM(S): 5 TABLET ORAL at 11:02

## 2020-12-19 RX ADMIN — APIXABAN 2.5 MILLIGRAM(S): 2.5 TABLET, FILM COATED ORAL at 05:06

## 2020-12-19 RX ADMIN — SODIUM CHLORIDE 500 MILLILITER(S): 9 INJECTION, SOLUTION INTRAVENOUS at 05:05

## 2020-12-19 RX ADMIN — Medication 15 MILLIGRAM(S): at 13:02

## 2020-12-19 RX ADMIN — Medication 15 MILLIGRAM(S): at 05:06

## 2020-12-19 RX ADMIN — PANTOPRAZOLE SODIUM 40 MILLIGRAM(S): 20 TABLET, DELAYED RELEASE ORAL at 08:08

## 2020-12-19 RX ADMIN — OXYCODONE HYDROCHLORIDE 10 MILLIGRAM(S): 5 TABLET ORAL at 12:02

## 2020-12-19 RX ADMIN — Medication 101.6 MILLIGRAM(S): at 05:06

## 2020-12-19 RX ADMIN — Medication 975 MILLIGRAM(S): at 05:06

## 2020-12-19 RX ADMIN — Medication 15 MILLIGRAM(S): at 02:00

## 2020-12-19 RX ADMIN — Medication 100 MILLIGRAM(S): at 05:07

## 2020-12-19 RX ADMIN — Medication 15 MILLIGRAM(S): at 06:05

## 2020-12-19 RX ADMIN — Medication 75 MILLIGRAM(S): at 05:06

## 2020-12-19 RX ADMIN — SODIUM CHLORIDE 3 MILLILITER(S): 9 INJECTION INTRAMUSCULAR; INTRAVENOUS; SUBCUTANEOUS at 05:05

## 2020-12-19 RX ADMIN — Medication 500 MILLIGRAM(S): at 05:06

## 2020-12-19 RX ADMIN — Medication 15 MILLIGRAM(S): at 00:23

## 2020-12-19 NOTE — DISCHARGE NOTE PROVIDER - CARE PROVIDER_API CALL
Troy Antonio)  Orthopedics  [address], [address]  [place] [address]  Phone: [phone]  Fax: [phone]  Follow Up Time:

## 2020-12-19 NOTE — DISCHARGE NOTE NURSING/CASE MANAGEMENT/SOCIAL WORK - NSDCPEFALRISK_GEN_ALL_CORE
Voicemail left for [facility] to determine openings and wait lists in order to plan discharge with PHP beginning the following day after discharge  Patient information on fall and injury prevention

## 2020-12-19 NOTE — PROGRESS NOTE ADULT - SUBJECTIVE AND OBJECTIVE BOX
Patient seen and examined at bedside. Pain is controlled. Pt feeling well. No cp, sob, nausea or vomiting.    Vital Signs Last 24 Hrs  T(C): 36.5 (12-18-20 @ 21:56), Max: 36.8 (12-18-20 @ 16:46)  T(F): 97.7 (12-18-20 @ 21:56), Max: 98.2 (12-18-20 @ 16:46)  HR: 89 (12-18-20 @ 21:56) (85 - 97)  BP: 124/71 (12-18-20 @ 21:56) (120/75 - 153/79)  BP(mean): --  RR: 18 (12-18-20 @ 21:56) (15 - 20)  SpO2: 95% (12-18-20 @ 21:56) (94% - 98%)          Exam:  Gen: NAD, resting comfortably  Dressing c/d/i  +EHL/FHL/TA/GS  SILT L2-S1  +DP/PT 2+  Calf NTTP b/l  Compartments soft and compressible    A/P:  60yFemale Stable POD 1  s/p L TKA    -FU AM labs  -WBAT  -Pain control PRN  -PT/OT  -Ppx ABX  -DVT PE ppx: Eliquis  -Incentive spirometry  - Dispo planning per PT: Home  - WIll discuss with Dr. Antonio and advise of any changes to plan   Patient seen and examined at bedside. Pain is controlled. Pt feeling well. No cp, sob, nausea or vomiting.    Vital Signs Last 24 Hrs  T(C): 36.6 (19 Dec 2020 04:46), Max: 36.8 (18 Dec 2020 16:46)  T(F): 97.9 (19 Dec 2020 04:46), Max: 98.2 (18 Dec 2020 16:46)  HR: 87 (19 Dec 2020 04:46) (85 - 97)  BP: 144/85 (19 Dec 2020 04:46) (120/75 - 153/79)  BP(mean): --  RR: 19 (19 Dec 2020 04:46) (15 - 20)  SpO2: 99% (19 Dec 2020 04:46) (94% - 99%)          Exam:  Gen: NAD, resting comfortably  Dressing c/d/i  +EHL/FHL/TA/GS  SILT L2-S1  +DP/PT 2+  Calf NTTP b/l  Compartments soft and compressible    A/P:  60yFemale Stable POD 1  s/p L TKA    -FU AM labs  -WBAT  -Pain control PRN  -PT/OT  -Ppx ABX  -DVT PE ppx: Eliquis  -Incentive spirometry  - Dispo planning per PT: Home  - WIll discuss with Dr. Antonio and advise of any changes to plan

## 2020-12-19 NOTE — DISCHARGE NOTE PROVIDER - HOSPITAL COURSE
The patient is a 60 year old female status post elective total knee Arthroplasty to the LEFT knee after failing outpatient nonoperative conservative management. Patient presented to [facility] after being medically cleared for an elective surgical procedure. The patient was taken to the operating room on date mentioned above. Prophylactic antibiotics were started before the procedure and continued for 24 hours. There were no complications during the procedure and patient tolerated the procedure well. The patient was transferred to the recovery room in stable condition and subsequently to the surgical floor. The patient was placed on Eliquis for anticoagulation. All home medications were continued. The patient received physical therapy daily and daily labs were followed. The dressing was kept clean, dry, intact. The rest of the hospital stay was unremarkable.

## 2020-12-19 NOTE — DISCHARGE NOTE PROVIDER - NSDCMRMEDTOKEN_GEN_ALL_CORE_FT
acetaminophen 325 mg oral tablet: 3 tab(s) orally every 8 hours MDD:3  CeleBREX 200 mg oral capsule: 1 cap(s) orally 2 times a day MDD:2  Eliquis 2.5 mg oral tablet: 1 tab(s) orally 2 times a day MDD:2  Lyrica 75 mg oral capsule: 1 cap(s) orally 2 times a day MDD:2  omeprazole 40 mg oral delayed release capsule: 1 cap(s) orally once a day MDD:1  oxyCODONE 5 mg oral tablet: 1 tab(s) orally every 6 hours MDD:4  traMADol 50 mg oral tablet: 1 tab(s) orally every 6 hours MDD:4

## 2020-12-19 NOTE — DISCHARGE NOTE NURSING/CASE MANAGEMENT/SOCIAL WORK - NSDCPNINST_GEN_ALL_CORE
Take your medications exactly as prescribed. Having your pain under control will help increase activity, improve deep breathing and coughing and prevent complications like pneumonia and blood clots in your legs. Some of the common side effects of pain medications are nausea, vomiting, itching, rash and upset stomach. Contact your doctor if you develop these or any other unusual systoms. Eat a diet rich in fiber and drink plenty of oral fluids. Use other pain management methods like, cold and warm applications, elevation of affected body part, listening to music, watching TV, yoga, etc.Do not take any other medications unless approved by your doctor. Do not drive, operate machinery, drink alcohol, or make any important decisions while taking narcotic pain medications. Store medication in its original bottle, in a locked cabinet away from the reach of children. Dispose of any unused and  medication safely.

## 2020-12-19 NOTE — PROGRESS NOTE ADULT - SUBJECTIVE AND OBJECTIVE BOX
Orthopedics Post-op Check  POD 0  Patient seen and examined at bedside. Pain is controlled. Pt feeling well. No cp, sob, nausea or vomiting.    Vital Signs Last 24 Hrs  T(C): 36.5 (12-18-20 @ 21:56), Max: 36.8 (12-18-20 @ 16:46)  T(F): 97.7 (12-18-20 @ 21:56), Max: 98.2 (12-18-20 @ 16:46)  HR: 89 (12-18-20 @ 21:56) (85 - 97)  BP: 124/71 (12-18-20 @ 21:56) (120/75 - 153/79)  BP(mean): --  RR: 18 (12-18-20 @ 21:56) (15 - 20)  SpO2: 95% (12-18-20 @ 21:56) (94% - 98%)              Exam:  Gen: NAD, resting comfortably  Dressing c/d/i  +EHL/FHL/TA/GS  SILT L2-S1  +DP/PT 2+  Calf NTTP b/l  Compartments soft and compressible    A/P:  60yFemale Stable POD 0  s/p L TKA    -FU AM labs  -WBAT  -Pain control PRN  -PT/OT  -Ppx ABX  -DVT PE ppx: A81  -Incentive spirometry  - Dispo planning per PT: Home  - WIll discuss with Dr. Antonio and advise of any changes to plan Orthopedics Post-op Check  POD 0  Patient seen and examined at bedside. Pain is controlled. Pt feeling well. No cp, sob, nausea or vomiting.    Vital Signs Last 24 Hrs  T(C): 36.5 (12-18-20 @ 21:56), Max: 36.8 (12-18-20 @ 16:46)  T(F): 97.7 (12-18-20 @ 21:56), Max: 98.2 (12-18-20 @ 16:46)  HR: 89 (12-18-20 @ 21:56) (85 - 97)  BP: 124/71 (12-18-20 @ 21:56) (120/75 - 153/79)  BP(mean): --  RR: 18 (12-18-20 @ 21:56) (15 - 20)  SpO2: 95% (12-18-20 @ 21:56) (94% - 98%)              Exam:  Gen: NAD, resting comfortably  Dressing c/d/i  +EHL/FHL/TA/GS  SILT L2-S1  +DP/PT 2+  Calf NTTP b/l  Compartments soft and compressible    A/P:  60yFemale Stable POD 0  s/p L TKA    -FU AM labs  -WBAT  -Pain control PRN  -PT/OT  -Ppx ABX  -DVT PE ppx: Eliquis  -Incentive spirometry  - Dispo planning per PT: Home  - WIll discuss with Dr. Antonio and advise of any changes to plan

## 2020-12-19 NOTE — DISCHARGE NOTE NURSING/CASE MANAGEMENT/SOCIAL WORK - PATIENT PORTAL LINK FT
You can access the FollowMyHealth Patient Portal offered by [facility] by registering at the following website: [email or web address]. By joining [facility] FollowMyHealth portal, you will also be able to view your health information using other applications (apps) compatible with our system.

## 2020-12-19 NOTE — DISCHARGE NOTE PROVIDER - NSDCFUADDINST_GEN_ALL_CORE_FT
1. Diet: Resume previous diet.  2. Activity: WBAT with assistive devices as needed.  3. Call with: fever over 101 F, wound redness, drainage or open area, calf pain/calf swelling.  4. Wound Care: DO NOT remove Aquacel bandage. NO PEEKING! Bandage will be removed on post-operative day 10.  5. Ok to Shower with Aquacel. Avoid direct water beating on bandage. Do not submerge.   6. DVT/PE Prophylaxis: Eliquis twice a day for 28 days. DO NOT skip doses.   7. Continue Protonix daily while on anticoagulant medication. An electronic prescription has been sent to your pharmacy.  8. Follow up with Dr. Antonio in 2 weeks. Please call the office to schedule appointment.   9. Pain medication: An electronic prescription has been sent to your pharmacy for  if you go home. Please take medications as prescribed.

## 2020-12-19 NOTE — PROGRESS NOTE ADULT - ATTENDING COMMENTS
I agree with the above note and have personally seen and examined this patient. All pertinent films have been reviewed. Please refer to clinical documentation of the history, physical examinations, data summary, and both assessment and plan as documented above and with which I agree.    Troy Antonio MD  Attending Orthopedic Surgeon

## 2020-12-22 DIAGNOSIS — M17.12 UNILATERAL PRIMARY OSTEOARTHRITIS, LEFT KNEE: ICD-10-CM

## 2021-01-04 DIAGNOSIS — Z00.00 ENCOUNTER FOR GENERAL ADULT MEDICAL EXAMINATION W/OUT ABNORMAL FINDINGS: ICD-10-CM

## 2021-01-05 ENCOUNTER — APPOINTMENT (OUTPATIENT)
Dept: ORTHOPEDIC SURGERY | Facility: CLINIC | Age: 61
End: 2021-01-05
Payer: COMMERCIAL

## 2021-01-05 VITALS — TEMPERATURE: 97.3 F

## 2021-01-05 DIAGNOSIS — M17.12 UNILATERAL PRIMARY OSTEOARTHRITIS, LEFT KNEE: ICD-10-CM

## 2021-01-05 DIAGNOSIS — M17.10 UNILATERAL PRIMARY OSTEOARTHRITIS, UNSPECIFIED KNEE: ICD-10-CM

## 2021-01-05 DIAGNOSIS — Z01.818 ENCOUNTER FOR OTHER PREPROCEDURAL EXAMINATION: ICD-10-CM

## 2021-01-05 PROCEDURE — 73564 X-RAY EXAM KNEE 4 OR MORE: CPT | Mod: LT

## 2021-01-05 PROCEDURE — 99024 POSTOP FOLLOW-UP VISIT: CPT

## 2021-01-05 NOTE — HISTORY OF PRESENT ILLNESS
[de-identified] : Status-post left total knee arthroplasty here for initial postoperative evaluation. Excellent progress is noted in terms of pain and restoration of function. Pain is well controlled with oral medications. There has been no change in medical health since discharge. The patient brings a cane with her when she ambulates but does not use it.

## 2021-01-05 NOTE — DISCUSSION/SUMMARY
[de-identified] : The patient is doing well after left total knee arthroplasty. Written infectious precautions were reviewed. The patient will progress with physical therapy at this time and they will work on transitioning from requiring assistive devices for ambulation.  Amelia therapy will be continued for the full 4-week postoperative course for the purpose of orthopedic thromboembolism prophylaxis. Return around the 6 week anniversary from surgery for follow-up evaluation.

## 2021-01-05 NOTE — PHYSICAL EXAM
[de-identified] : Patient is well nourished, well-developed, in no acute distress, with appropriate mood and affect. The patient is oriented to time, place, and person. Respirations are even and unlabored. Examination reveals satisfactory wound healing. No surrounding erythema. Motion is good and relatively pain free. Active knee flexion is greater than 90 degrees. [de-identified] : AP, lateral, sunrise knee x-rays of the left knee were ordered and obtained in the office and demonstrate satisfactory position and alignment of the components are present. No signs of loosening are seen.

## 2021-02-02 ENCOUNTER — APPOINTMENT (OUTPATIENT)
Dept: ORTHOPEDIC SURGERY | Facility: CLINIC | Age: 61
End: 2021-02-02
Payer: COMMERCIAL

## 2021-02-02 VITALS — BODY MASS INDEX: 29.16 KG/M2 | HEIGHT: 65 IN | WEIGHT: 175 LBS

## 2021-02-02 VITALS — TEMPERATURE: 94.5 F

## 2021-02-02 PROCEDURE — 99072 ADDL SUPL MATRL&STAF TM PHE: CPT

## 2021-02-02 PROCEDURE — 99214 OFFICE O/P EST MOD 30 MIN: CPT | Mod: 24

## 2021-02-02 NOTE — HISTORY OF PRESENT ILLNESS
[de-identified] : This is a very pleasant 60-year-old female who is status-post left total knee arthroplasty 6 weeks ago.  She is thrilled with her progress in recovery from this.  Is ambulating with a cane only because her right knee has been bothering her.  Her right knee has been painful for more than 3 months and she has known right knee osteoarthritis that is severe.  She has been delaying surgery on the right knee because she has been recovering from the left knee.  The right knee is to the point that she would like to proceed with surgery at this point.  She has failed a course of conservative management including anti-inflammatories physical therapy and rest.  The patient denies any radiation of the pain to the feet and it is not associated with numbness, tingling, or weakness.  Walking tolerance is limited.  Activities of daily living are limited because of the right knee.

## 2021-02-02 NOTE — REASON FOR VISIT
[Artificial Knee Joint] : artificial knee joint [Follow-Up Visit] : a follow-up visit for [Osteoarthritis, Knee] : osteoarthritis, knee

## 2021-02-02 NOTE — DISCUSSION/SUMMARY
[de-identified] : The patient is doing well after left total knee arthroplasty.  Continue to be weight-bear as tolerated for this.  Follow-up as recommended to 6-month anniversary from the left total knee arthroplasty.\par \par She has severe right knee osteoarthritis.  She has failed a course of conservative management for this and would like to proceed with a right total knee arthroplasty using robotic navigation for assistance.\par \par The patient is an appropriate candidate for consideration of right total knee replacement. An extensive discussion was conducted of the natural history of the disease and the variety of surgical and non-surgical treatment options available to the patient. A risk/benefit analysis was discussed with the patient reviewing the advantages and disadvantages of surgical intervention at this time. Both the level and length of the patient's pain have made additional conservative treatment measures consisting of NSAIDs, physical therapy, corticosteroids, and/or viscosupplementation contraindicated. A full explanation was given of the nature and the purpose of the procedure and anesthesia, its benefits, possible alternative methods of diagnosis or treatment, the risks involved, the possibility of complications, the foreseeable consequences of the procedure and the possible results of the non-treatment. I reviewed the plan of care as well as used a model of a total knee implant equivalent to the one that will be used for their total knee joint replacement. The ability to secure the implant utilizing cement or cementless (press-fit) was discussed with the patient. The patient agrees with the plan of care, as well as the use of implants for their total knee replacement.   We also discussed that if robotic/computer navigation is utilized, then additional incisions may need to be made to accommodate the computer navigation arrays, which will be placed in the femur and tibia.\par \par No guarantee or assurance was made as to the results that may be obtained. Specifically, the risks were identified to include, but are not limited to the following: Infection, phlebitis, pulmonary embolism, death, paralysis, dislocation, pain, stiffness, instability, limp, weakness, breakage, leg-length inequality, uncontrolled bleeding, nerve injury, blood vessel injury, pressure sores, anesthetic risks, delayed healing of wound and bone, and wear and loosening. Further discussion was undertaken with the patient about the details of surgical preparation, treatment, and postoperative rehabilitation including medical clearance, autotransfusion, the hospital course, and the postoperative rehabilitation involved. All in all, I feel that this patient is a good candidate for surgical reconstruction.\par \par The patient and I discussed the current SARS-CoV-2 (COVID-19) pandemic which has affected our local hospitals. We discussed that our hospitals treat patients with COVID-19. All efforts will be made to avoid cohorting the patient with diagnosed or suspected COVID-19 patient. They also understand that we will screen them 24-48 hours prior to surgery. Despite our best efforts, there is a potential risk for iatrogenic transmission of COVID-19 to the patient during the perioperative period. An COVID-19 during the perioperative period may increase the patient´s risks of an adverse outcome including postoperative pneumonia, difficulty breathing, requirement for a breathing tube (general endotracheal intubation), and death. The patient is understanding of this risk, and is willing to proceed with surgery at this time.

## 2021-02-02 NOTE — PHYSICAL EXAM
[de-identified] : Patient is well nourished, well-developed, in no acute distress, with appropriate mood and affect. The patient is oriented to time, place, and person. Respirations are even and unlabored. Gait evaluation does reveal a limp. There is no inguinal adenopathy. Bilateral limbs are well-perfused, without skin lesions, shows a grossly normal motor and sensory examination. The right knee motion is significantly reduced and does cause significant pain. The right knee moves from [0 to 125] degrees. The knee is stable within that range-of-motion to AP and ML stress. The alignment of the knee is [5 degrees varus]. Muscle strength is normal. Pedal pulses are palpable. Hip examination was negative. The left knee motion is painless and the left knee moves from 0-120 degrees.  Well-healed midline surgical scar.  The knee is stable within that range-of-motion to AP and ML stress. The alignment of the knee is neutral. Muscle strength is normal. Pedal pulses are palpable. Hip examination was negative. [de-identified] : Long standing knee, AP knee, lateral knee, and patellar views of the right knee were reviewed from the previous visit and demonstrate severe degenerative joint disease of the knee with joint space narrowing, osteophyte formation, and subchondral sclerosis.

## 2021-03-03 ENCOUNTER — RESULT REVIEW (OUTPATIENT)
Age: 61
End: 2021-03-03

## 2021-03-03 ENCOUNTER — OUTPATIENT (OUTPATIENT)
Dept: OUTPATIENT SERVICES | Facility: HOSPITAL | Age: 61
LOS: 1 days | End: 2021-03-03
Payer: COMMERCIAL

## 2021-03-03 ENCOUNTER — APPOINTMENT (OUTPATIENT)
Dept: CT IMAGING | Facility: CLINIC | Age: 61
End: 2021-03-03
Payer: COMMERCIAL

## 2021-03-03 DIAGNOSIS — Z98.890 OTHER SPECIFIED POSTPROCEDURAL STATES: Chronic | ICD-10-CM

## 2021-03-03 DIAGNOSIS — Z90.49 ACQUIRED ABSENCE OF OTHER SPECIFIED PARTS OF DIGESTIVE TRACT: Chronic | ICD-10-CM

## 2021-03-03 DIAGNOSIS — M17.11 UNILATERAL PRIMARY OSTEOARTHRITIS, RIGHT KNEE: ICD-10-CM

## 2021-03-03 DIAGNOSIS — Z90.89 ACQUIRED ABSENCE OF OTHER ORGANS: Chronic | ICD-10-CM

## 2021-03-03 DIAGNOSIS — Z90.13 ACQUIRED ABSENCE OF BILATERAL BREASTS AND NIPPLES: Chronic | ICD-10-CM

## 2021-03-03 PROCEDURE — 73700 CT LOWER EXTREMITY W/O DYE: CPT | Mod: 26,RT

## 2021-03-03 PROCEDURE — 73700 CT LOWER EXTREMITY W/O DYE: CPT

## 2021-03-04 ENCOUNTER — OUTPATIENT (OUTPATIENT)
Dept: OUTPATIENT SERVICES | Facility: HOSPITAL | Age: 61
LOS: 1 days | Discharge: HOME | End: 2021-03-04
Payer: COMMERCIAL

## 2021-03-04 VITALS
TEMPERATURE: 98 F | HEART RATE: 77 BPM | SYSTOLIC BLOOD PRESSURE: 131 MMHG | HEIGHT: 65 IN | OXYGEN SATURATION: 96 % | RESPIRATION RATE: 17 BRPM | WEIGHT: 179.9 LBS | DIASTOLIC BLOOD PRESSURE: 76 MMHG

## 2021-03-04 DIAGNOSIS — Z90.13 ACQUIRED ABSENCE OF BILATERAL BREASTS AND NIPPLES: Chronic | ICD-10-CM

## 2021-03-04 DIAGNOSIS — Z90.89 ACQUIRED ABSENCE OF OTHER ORGANS: Chronic | ICD-10-CM

## 2021-03-04 DIAGNOSIS — Z01.818 ENCOUNTER FOR OTHER PREPROCEDURAL EXAMINATION: ICD-10-CM

## 2021-03-04 DIAGNOSIS — M17.11 UNILATERAL PRIMARY OSTEOARTHRITIS, RIGHT KNEE: ICD-10-CM

## 2021-03-04 DIAGNOSIS — Z90.49 ACQUIRED ABSENCE OF OTHER SPECIFIED PARTS OF DIGESTIVE TRACT: Chronic | ICD-10-CM

## 2021-03-04 DIAGNOSIS — Z96.652 PRESENCE OF LEFT ARTIFICIAL KNEE JOINT: Chronic | ICD-10-CM

## 2021-03-04 DIAGNOSIS — Z98.890 OTHER SPECIFIED POSTPROCEDURAL STATES: Chronic | ICD-10-CM

## 2021-03-04 DIAGNOSIS — Z98.891 HISTORY OF UTERINE SCAR FROM PREVIOUS SURGERY: Chronic | ICD-10-CM

## 2021-03-04 LAB
ANION GAP SERPL CALC-SCNC: 7 MMOL/L — SIGNIFICANT CHANGE UP (ref 5–17)
APTT BLD: 34.4 SEC — SIGNIFICANT CHANGE UP (ref 27.5–35.5)
BLD GP AB SCN SERPL QL: SIGNIFICANT CHANGE UP
BUN SERPL-MCNC: 17 MG/DL — SIGNIFICANT CHANGE UP (ref 7–23)
CALCIUM SERPL-MCNC: 9 MG/DL — SIGNIFICANT CHANGE UP (ref 8.5–10.1)
CHLORIDE SERPL-SCNC: 108 MMOL/L — SIGNIFICANT CHANGE UP (ref 96–108)
CO2 SERPL-SCNC: 28 MMOL/L — SIGNIFICANT CHANGE UP (ref 22–31)
CREAT SERPL-MCNC: 0.74 MG/DL — SIGNIFICANT CHANGE UP (ref 0.5–1.3)
GLUCOSE SERPL-MCNC: 89 MG/DL — SIGNIFICANT CHANGE UP (ref 70–99)
HCT VFR BLD CALC: 44.2 % — SIGNIFICANT CHANGE UP (ref 34.5–45)
HGB BLD-MCNC: 14.2 G/DL — SIGNIFICANT CHANGE UP (ref 11.5–15.5)
INR BLD: 1.2 RATIO — HIGH (ref 0.88–1.16)
MCHC RBC-ENTMCNC: 27 PG — SIGNIFICANT CHANGE UP (ref 27–34)
MCHC RBC-ENTMCNC: 32.1 GM/DL — SIGNIFICANT CHANGE UP (ref 32–36)
MCV RBC AUTO: 84.2 FL — SIGNIFICANT CHANGE UP (ref 80–100)
NRBC # BLD: 0 /100 WBCS — SIGNIFICANT CHANGE UP (ref 0–0)
PLATELET # BLD AUTO: 256 K/UL — SIGNIFICANT CHANGE UP (ref 150–400)
POTASSIUM SERPL-MCNC: 3.6 MMOL/L — SIGNIFICANT CHANGE UP (ref 3.5–5.3)
POTASSIUM SERPL-SCNC: 3.6 MMOL/L — SIGNIFICANT CHANGE UP (ref 3.5–5.3)
PROTHROM AB SERPL-ACNC: 13.8 SEC — HIGH (ref 10.6–13.6)
RBC # BLD: 5.25 M/UL — HIGH (ref 3.8–5.2)
RBC # FLD: 13.9 % — SIGNIFICANT CHANGE UP (ref 10.3–14.5)
SODIUM SERPL-SCNC: 143 MMOL/L — SIGNIFICANT CHANGE UP (ref 135–145)
WBC # BLD: 6.56 K/UL — SIGNIFICANT CHANGE UP (ref 3.8–10.5)
WBC # FLD AUTO: 6.56 K/UL — SIGNIFICANT CHANGE UP (ref 3.8–10.5)

## 2021-03-04 PROCEDURE — 93010 ELECTROCARDIOGRAM REPORT: CPT

## 2021-03-04 NOTE — PHYSICAL THERAPY INITIAL EVALUATION ADULT - DISCHARGE DISPOSITION, PT EVAL
Home c home PT pending functional status post-operatively. Reports owns cane, rolling walker and 3:1 commode all accessible and in good working condition.

## 2021-03-04 NOTE — PHYSICAL THERAPY INITIAL EVALUATION ADULT - MANUAL MUSCLE TESTING RESULTS, REHAB EVAL
3-/5 right knee extensors, 3+/5 right knee flexors, hip flexors. 4/5 left lower extremity/grossly assessed due to

## 2021-03-04 NOTE — PHYSICAL THERAPY INITIAL EVALUATION ADULT - ADDITIONAL COMMENTS
As per pre-op 12/3/2020 and reviewed with patient today: Pt lives in a private home c 2 small steps, no rails but can fit a rolling walker (Pt reported that she was able to safely negotiate the 2 small steps c the rolling walker I), then 5 steps Alcides rails far apart and a platform step at the top that can fit a RW to enter the house. Inside pt has 5 steps to negotiate with L rail up. BR has tub-shower, grab bar, retractable shower head, standard toilet seat height that can fit 3:1 commode. Pt owns cane and uses for all activities. Wears eyeglasses for reading and distance, is R handed, currently drives, denies use of hearing aids.  Average pain level at rest is 3/10, increases to 6/10 c prolonged standing, after doing PT exercises. Pt denies taking pain meds currently, reports resting helps alleviate pain. Denies h/o adverse reactions to pain meds. Pt stopped outpatient PT 1 week ago but is doing exercises at home, denies h/o falls, denies buckling in right knee.

## 2021-03-04 NOTE — OCCUPATIONAL THERAPY INITIAL EVALUATION ADULT - ADDITIONAL COMMENTS
Pt had L TKR done on 12/18/20 at [facility] and pt went home with home PT. Patient lives with spouse (Who can assist post op) in a private house with 2 steps (No rails, but steps are small and pt reports that she can place a walker on the second step) to a landing and then another 5 steps with a R rail to reach the front door. Once inside, the patient has 5 steps with a L rail to reach the main floor where the bedroom and bathroom is. The patients bathroom has a tub/shower combination, retractable shower head, standard toilet seat and + Grab bars. The pt has a 3/1 commode at home. The patient ambulates with no device inside of the home and uses a cane outside. Pt owns a rolling walker. The patient daily pain is a 3/10 at rest and a 6/10 with movement. The patient manages the pain with rest. The patient has no recent outpatient PT, no recent falls and no buckling of the knee. The patient wears glasses for distance, R handed, drives and has no hearing impairments.

## 2021-03-04 NOTE — PHYSICAL THERAPY INITIAL EVALUATION ADULT - PERTINENT HX OF CURRENT PROBLEM, REHAB EVAL
Patient attends Pre-Op Testing today following consult with Dr. Antonio due to chronic pain to right knee. Significant surgical/medical histories of left TKA 12/2020. Elective right TKA is now scheduled in this facility for 3/19/2021.

## 2021-03-04 NOTE — H&P PST ADULT - HISTORY OF PRESENT ILLNESS
60 year old female with a past medial history of breast ca ( 2007, 2008), Lymphedema, HTN, HLD c/o pain and limited ROM to right knee.  She is scheduled for a right knee replacement on 3/19/2020.    Patient denies fever, chills, SOB, recent travel, and recent sick contacts.    Goal: To walk without pain and cane

## 2021-03-04 NOTE — OCCUPATIONAL THERAPY INITIAL EVALUATION ADULT - PERTINENT HX OF CURRENT PROBLEM, REHAB EVAL
R knee OA which impacts pt's ability to perform functional tasks and transfers/mobility. Pt is scheduled for R TKA on 3/19/21.

## 2021-03-04 NOTE — H&P PST ADULT - NSICDXPASTSURGICALHX_GEN_ALL_CORE_FT
PAST SURGICAL HISTORY:  H/O bilateral mastectomy     H/O rhinoplasty     H/O:      History of appendectomy     History of left knee replacement     History of tonsillectomy

## 2021-03-04 NOTE — H&P PST ADULT - NSICDXPROBLEM_GEN_ALL_CORE_FT
PROBLEM DIAGNOSES  Problem: Primary osteoarthritis of right knee  Assessment and Plan: Right total knee replacement     Problem: Preoperative examination  Assessment and Plan: labs - cbc,pt/ptt,bmp,t&s,nose cx,ekg  M/C required  preop 3 day hibiclens instruction reviewed and given .instructed on if  nose cx positive use mupuricin 5 days and checklist given  take routine meds DOS with sips of water. avoid NSAID and OTC supplements. verbalized understanding  information on proper nutrition , increase protein and better food choices provided in packet

## 2021-03-04 NOTE — H&P PST ADULT - ASSESSMENT
60 year old female with a past medial history of breast ca ( , ), Lymphedema, HTN, HLD c/o pain and limited ROM to right knee.  She is scheduled for a right knee replacement on 3/19/2020.    CAPRINI SCORE [CLOT]    AGE RELATED RISK FACTORS                                                       MOBILITY RELATED FACTORS  [ ] Age 41-60 years                                            (1 Point)                  [ ] Bed rest                                                        (1 Point)  [x ] Age: 61-74 years                                           (2 Points)                 [ ] Plaster cast                                                   (2 Points)  [ ] Age= 75 years                                              (3 Points)                 [ ] Bed bound for more than 72 hours                 (2 Points)    DISEASE RELATED RISK FACTORS                                               GENDER SPECIFIC FACTORS  [ ] Edema in the lower extremities                       (1 Point)                  [ ] Pregnancy                                                     (1 Point)  [ ] Varicose veins                                               (1 Point)                  [ ] Post-partum < 6 weeks                                   (1 Point)             [x ] BMI > 25 Kg/m2                                            (1 Point)                  [ ] Hormonal therapy  or oral contraception          (1 Point)                 [ ] Sepsis (in the previous month)                        (1 Point)                  [ ] History of pregnancy complications                 (1 point)  [ ] Pneumonia or serious lung disease                                               [ ] Unexplained or recurrent                     (1 Point)           (in the previous month)                               (1 Point)  [ ] Abnormal pulmonary function test                     (1 Point)                 SURGERY RELATED RISK FACTORS  [ ] Acute myocardial infarction                              (1 Point)                 [ ]  Section                                             (1 Point)  [ ] Congestive heart failure (in the previous month)  (1 Point)               [ ] Minor surgery                                                  (1 Point)   [ ] Inflammatory bowel disease                             (1 Point)                 [ ] Arthroscopic surgery                                        (2 Points)  [ ] Central venous access                                      (2 Points)                [ ] General surgery lasting more than 45 minutes   (2 Points)       [ ] Stroke (in the previous month)                          (5 Points)               [x ] Elective arthroplasty                                         (5 Points)                                                                                                                                               HEMATOLOGY RELATED FACTORS                                                 TRAUMA RELATED RISK FACTORS  [ ] Prior episodes of VTE                                     (3 Points)                [ ] Fracture of the hip, pelvis, or leg                       (5 Points)  [ ] Positive family history for VTE                         (3 Points)                 [ ] Acute spinal cord injury (in the previous month)  (5 Points)  [ ] Prothrombin 56221 A                                     (3 Points)                 [ ] Paralysis  (less than 1 month)                             (5 Points)  [ ] Factor V Leiden                                             (3 Points)                  [ ] Multiple Trauma within 1 month                        (5 Points)  [ ] Lupus anticoagulants                                     (3 Points)                                                           [ ] Anticardiolipin antibodies                               (3 Points)                                                       [ ] High homocysteine in the blood                      (3 Points)                                             [ ] Other congenital or acquired thrombophilia      (3 Points)                                                [ ] Heparin induced thrombocytopenia                  (3 Points)                                          Total Score [    8     ]    Caprini Score 0 - 2:  Low Risk, No VTE Prophylaxis required for most patients, encourage ambulation  Caprini Score 3 - 6:  At Risk, pharmacologic VTE prophylaxis is indicated for most patients (in the absence of a contraindication)  Caprini Score Greater than or = 7:  High Risk, pharmacologic VTE prophylaxis is indicated for most patients (in the absence of a contraindication)    Caprini score indicates that the patient is high risk for VTE event ( score 6 or greater). Surgical patient's in this group will benefit from both pharmacologic prophylaxis and intermittent compression devices . Surgical team will determine the balance between VTE  risk and bleeding risk and other clinical considerations

## 2021-03-04 NOTE — PHYSICAL THERAPY INITIAL EVALUATION ADULT - MODALITIES TREATMENT COMMENTS
One Leg Stand Test (OLST): Right: >5s Functional test to assess fall risk. Both gait and stair negotiation require components of OLST. Participants unable to perform the OLST for at least 5 seconds are at increased risk for injurious fall. 30 second Sit to Stand Test: (reps: 16 adaptation: none) Functional assessment of lower extremity strength and ability to maintain balance in standing, discriminates those with low and high levels of functional activity.

## 2021-03-04 NOTE — H&P PST ADULT - MUSCULOSKELETAL
details… right knee/decreased ROM/decreased ROM due to pain/joint swelling/diminished strength detailed exam

## 2021-03-05 LAB
A1C WITH ESTIMATED AVERAGE GLUCOSE RESULT: 5.6 % — SIGNIFICANT CHANGE UP (ref 4–5.6)
ESTIMATED AVERAGE GLUCOSE: 114 MG/DL — SIGNIFICANT CHANGE UP (ref 68–114)
MRSA PCR RESULT.: SIGNIFICANT CHANGE UP
S AUREUS DNA NOSE QL NAA+PROBE: SIGNIFICANT CHANGE UP

## 2021-03-16 ENCOUNTER — APPOINTMENT (OUTPATIENT)
Dept: DISASTER EMERGENCY | Facility: CLINIC | Age: 61
End: 2021-03-16

## 2021-03-17 LAB — SARS-COV-2 N GENE NPH QL NAA+PROBE: NOT DETECTED

## 2021-03-18 ENCOUNTER — TRANSCRIPTION ENCOUNTER (OUTPATIENT)
Age: 61
End: 2021-03-18

## 2021-03-18 ENCOUNTER — FORM ENCOUNTER (OUTPATIENT)
Age: 61
End: 2021-03-18

## 2021-03-18 RX ORDER — ANASTROZOLE 1 MG/1
1 TABLET ORAL DAILY
Refills: 0 | Status: DISCONTINUED | OUTPATIENT
Start: 2021-03-19 | End: 2021-03-20

## 2021-03-18 RX ORDER — CITALOPRAM 10 MG/1
20 TABLET, FILM COATED ORAL DAILY
Refills: 0 | Status: DISCONTINUED | OUTPATIENT
Start: 2021-03-19 | End: 2021-03-20

## 2021-03-18 RX ORDER — LISINOPRIL 2.5 MG/1
40 TABLET ORAL DAILY
Refills: 0 | Status: DISCONTINUED | OUTPATIENT
Start: 2021-03-19 | End: 2021-03-20

## 2021-03-18 RX ORDER — ATORVASTATIN CALCIUM 80 MG/1
80 TABLET, FILM COATED ORAL AT BEDTIME
Refills: 0 | Status: DISCONTINUED | OUTPATIENT
Start: 2021-03-19 | End: 2021-03-20

## 2021-03-19 ENCOUNTER — INPATIENT (INPATIENT)
Facility: HOSPITAL | Age: 61
LOS: 0 days | Discharge: HOME | End: 2021-03-20
Attending: ORTHOPAEDIC SURGERY | Admitting: ORTHOPAEDIC SURGERY
Payer: COMMERCIAL

## 2021-03-19 ENCOUNTER — RESULT REVIEW (OUTPATIENT)
Age: 61
End: 2021-03-19

## 2021-03-19 ENCOUNTER — APPOINTMENT (OUTPATIENT)
Dept: ORTHOPEDIC SURGERY | Facility: HOSPITAL | Age: 61
End: 2021-03-19

## 2021-03-19 ENCOUNTER — TRANSCRIPTION ENCOUNTER (OUTPATIENT)
Age: 61
End: 2021-03-19

## 2021-03-19 VITALS
SYSTOLIC BLOOD PRESSURE: 131 MMHG | DIASTOLIC BLOOD PRESSURE: 83 MMHG | OXYGEN SATURATION: 98 % | HEIGHT: 65 IN | HEART RATE: 91 BPM | WEIGHT: 179.9 LBS | RESPIRATION RATE: 14 BRPM | TEMPERATURE: 98 F

## 2021-03-19 DIAGNOSIS — Z90.89 ACQUIRED ABSENCE OF OTHER ORGANS: Chronic | ICD-10-CM

## 2021-03-19 DIAGNOSIS — Z98.891 HISTORY OF UTERINE SCAR FROM PREVIOUS SURGERY: Chronic | ICD-10-CM

## 2021-03-19 DIAGNOSIS — Z90.49 ACQUIRED ABSENCE OF OTHER SPECIFIED PARTS OF DIGESTIVE TRACT: Chronic | ICD-10-CM

## 2021-03-19 DIAGNOSIS — Z90.13 ACQUIRED ABSENCE OF BILATERAL BREASTS AND NIPPLES: Chronic | ICD-10-CM

## 2021-03-19 DIAGNOSIS — Z96.652 PRESENCE OF LEFT ARTIFICIAL KNEE JOINT: Chronic | ICD-10-CM

## 2021-03-19 DIAGNOSIS — Z98.890 OTHER SPECIFIED POSTPROCEDURAL STATES: Chronic | ICD-10-CM

## 2021-03-19 PROCEDURE — 20985 CPTR-ASST DIR MS PX: CPT

## 2021-03-19 PROCEDURE — 27447 TOTAL KNEE ARTHROPLASTY: CPT | Mod: RT

## 2021-03-19 PROCEDURE — 88305 TISSUE EXAM BY PATHOLOGIST: CPT | Mod: 26

## 2021-03-19 PROCEDURE — 88311 DECALCIFY TISSUE: CPT | Mod: 26

## 2021-03-19 RX ORDER — SODIUM CHLORIDE 9 MG/ML
500 INJECTION, SOLUTION INTRAVENOUS ONCE
Refills: 0 | Status: COMPLETED | OUTPATIENT
Start: 2021-03-20 | End: 2021-03-20

## 2021-03-19 RX ORDER — ACETAMINOPHEN 500 MG
1000 TABLET ORAL ONCE
Refills: 0 | Status: COMPLETED | OUTPATIENT
Start: 2021-03-20 | End: 2021-03-20

## 2021-03-19 RX ORDER — CELECOXIB 200 MG/1
200 CAPSULE ORAL EVERY 12 HOURS
Refills: 0 | Status: DISCONTINUED | OUTPATIENT
Start: 2021-03-20 | End: 2021-03-20

## 2021-03-19 RX ORDER — TRAMADOL HYDROCHLORIDE 50 MG/1
50 TABLET ORAL EVERY 6 HOURS
Refills: 0 | Status: DISCONTINUED | OUTPATIENT
Start: 2021-03-19 | End: 2021-03-20

## 2021-03-19 RX ORDER — SODIUM CHLORIDE 9 MG/ML
1000 INJECTION, SOLUTION INTRAVENOUS
Refills: 0 | Status: DISCONTINUED | OUTPATIENT
Start: 2021-03-19 | End: 2021-03-19

## 2021-03-19 RX ORDER — CELECOXIB 200 MG/1
200 CAPSULE ORAL ONCE
Refills: 0 | Status: COMPLETED | OUTPATIENT
Start: 2021-03-19 | End: 2021-03-19

## 2021-03-19 RX ORDER — OXYCODONE HYDROCHLORIDE 5 MG/1
10 TABLET ORAL
Refills: 0 | Status: DISCONTINUED | OUTPATIENT
Start: 2021-03-19 | End: 2021-03-20

## 2021-03-19 RX ORDER — ACETAMINOPHEN 500 MG
3 TABLET ORAL
Qty: 60 | Refills: 0
Start: 2021-03-19

## 2021-03-19 RX ORDER — OMEPRAZOLE 10 MG/1
1 CAPSULE, DELAYED RELEASE ORAL
Qty: 30 | Refills: 0
Start: 2021-03-19 | End: 2021-04-17

## 2021-03-19 RX ORDER — SODIUM CHLORIDE 9 MG/ML
3 INJECTION INTRAMUSCULAR; INTRAVENOUS; SUBCUTANEOUS EVERY 8 HOURS
Refills: 0 | Status: DISCONTINUED | OUTPATIENT
Start: 2021-03-19 | End: 2021-03-19

## 2021-03-19 RX ORDER — FOLIC ACID 0.8 MG
1 TABLET ORAL DAILY
Refills: 0 | Status: DISCONTINUED | OUTPATIENT
Start: 2021-03-19 | End: 2021-03-20

## 2021-03-19 RX ORDER — SODIUM CHLORIDE 9 MG/ML
500 INJECTION, SOLUTION INTRAVENOUS ONCE
Refills: 0 | Status: COMPLETED | OUTPATIENT
Start: 2021-03-19 | End: 2021-03-19

## 2021-03-19 RX ORDER — CEFAZOLIN SODIUM 1 G
2000 VIAL (EA) INJECTION EVERY 8 HOURS
Refills: 0 | Status: COMPLETED | OUTPATIENT
Start: 2021-03-19 | End: 2021-03-20

## 2021-03-19 RX ORDER — ACETAMINOPHEN 500 MG
1000 TABLET ORAL ONCE
Refills: 0 | Status: COMPLETED | OUTPATIENT
Start: 2021-03-19 | End: 2021-03-19

## 2021-03-19 RX ORDER — DEXAMETHASONE 0.5 MG/5ML
8 ELIXIR ORAL
Refills: 0 | Status: COMPLETED | OUTPATIENT
Start: 2021-03-20 | End: 2021-03-20

## 2021-03-19 RX ORDER — APIXABAN 2.5 MG/1
2.5 TABLET, FILM COATED ORAL
Refills: 0 | Status: DISCONTINUED | OUTPATIENT
Start: 2021-03-20 | End: 2021-03-20

## 2021-03-19 RX ORDER — HYDROMORPHONE HYDROCHLORIDE 2 MG/ML
0.5 INJECTION INTRAMUSCULAR; INTRAVENOUS; SUBCUTANEOUS ONCE
Refills: 0 | Status: DISCONTINUED | OUTPATIENT
Start: 2021-03-19 | End: 2021-03-20

## 2021-03-19 RX ORDER — LORATADINE 10 MG/1
10 TABLET ORAL DAILY
Refills: 0 | Status: DISCONTINUED | OUTPATIENT
Start: 2021-03-19 | End: 2021-03-20

## 2021-03-19 RX ORDER — PANTOPRAZOLE SODIUM 20 MG/1
40 TABLET, DELAYED RELEASE ORAL
Refills: 0 | Status: DISCONTINUED | OUTPATIENT
Start: 2021-03-19 | End: 2021-03-20

## 2021-03-19 RX ORDER — KETOROLAC TROMETHAMINE 30 MG/ML
15 SYRINGE (ML) INJECTION EVERY 6 HOURS
Refills: 0 | Status: DISCONTINUED | OUTPATIENT
Start: 2021-03-19 | End: 2021-03-20

## 2021-03-19 RX ORDER — SENNA PLUS 8.6 MG/1
2 TABLET ORAL AT BEDTIME
Refills: 0 | Status: DISCONTINUED | OUTPATIENT
Start: 2021-03-19 | End: 2021-03-20

## 2021-03-19 RX ORDER — ASPIRIN/CALCIUM CARB/MAGNESIUM 324 MG
1 TABLET ORAL
Qty: 60 | Refills: 0
Start: 2021-03-19 | End: 2021-04-17

## 2021-03-19 RX ORDER — ONDANSETRON 8 MG/1
4 TABLET, FILM COATED ORAL EVERY 6 HOURS
Refills: 0 | Status: DISCONTINUED | OUTPATIENT
Start: 2021-03-19 | End: 2021-03-20

## 2021-03-19 RX ORDER — ACETAMINOPHEN 500 MG
975 TABLET ORAL EVERY 8 HOURS
Refills: 0 | Status: DISCONTINUED | OUTPATIENT
Start: 2021-03-20 | End: 2021-03-20

## 2021-03-19 RX ORDER — APIXABAN 2.5 MG/1
1 TABLET, FILM COATED ORAL
Qty: 60 | Refills: 0
Start: 2021-03-19 | End: 2021-04-17

## 2021-03-19 RX ORDER — ONDANSETRON 8 MG/1
4 TABLET, FILM COATED ORAL ONCE
Refills: 0 | Status: DISCONTINUED | OUTPATIENT
Start: 2021-03-19 | End: 2021-03-19

## 2021-03-19 RX ORDER — OXYCODONE HYDROCHLORIDE 5 MG/1
5 TABLET ORAL
Refills: 0 | Status: DISCONTINUED | OUTPATIENT
Start: 2021-03-19 | End: 2021-03-20

## 2021-03-19 RX ORDER — ASCORBIC ACID 60 MG
500 TABLET,CHEWABLE ORAL
Refills: 0 | Status: DISCONTINUED | OUTPATIENT
Start: 2021-03-19 | End: 2021-03-20

## 2021-03-19 RX ORDER — TRAMADOL HYDROCHLORIDE 50 MG/1
1 TABLET ORAL
Qty: 56 | Refills: 0
Start: 2021-03-19 | End: 2021-04-01

## 2021-03-19 RX ORDER — ACETAMINOPHEN 500 MG
650 TABLET ORAL ONCE
Refills: 0 | Status: COMPLETED | OUTPATIENT
Start: 2021-03-19 | End: 2021-03-19

## 2021-03-19 RX ORDER — MAGNESIUM HYDROXIDE 400 MG/1
30 TABLET, CHEWABLE ORAL DAILY
Refills: 0 | Status: DISCONTINUED | OUTPATIENT
Start: 2021-03-19 | End: 2021-03-20

## 2021-03-19 RX ORDER — HYDROMORPHONE HYDROCHLORIDE 2 MG/ML
0.5 INJECTION INTRAMUSCULAR; INTRAVENOUS; SUBCUTANEOUS
Refills: 0 | Status: DISCONTINUED | OUTPATIENT
Start: 2021-03-19 | End: 2021-03-19

## 2021-03-19 RX ORDER — OXYCODONE HYDROCHLORIDE 5 MG/1
1 TABLET ORAL
Qty: 30 | Refills: 0
Start: 2021-03-19 | End: 2021-03-23

## 2021-03-19 RX ADMIN — LISINOPRIL 40 MILLIGRAM(S): 2.5 TABLET ORAL at 21:43

## 2021-03-19 RX ADMIN — ATORVASTATIN CALCIUM 80 MILLIGRAM(S): 80 TABLET, FILM COATED ORAL at 21:45

## 2021-03-19 RX ADMIN — Medication 1 TABLET(S): at 13:58

## 2021-03-19 RX ADMIN — Medication 100 MILLIGRAM(S): at 18:15

## 2021-03-19 RX ADMIN — Medication 15 MILLIGRAM(S): at 23:57

## 2021-03-19 RX ADMIN — OXYCODONE HYDROCHLORIDE 5 MILLIGRAM(S): 5 TABLET ORAL at 17:40

## 2021-03-19 RX ADMIN — Medication 15 MILLIGRAM(S): at 18:12

## 2021-03-19 RX ADMIN — CELECOXIB 200 MILLIGRAM(S): 200 CAPSULE ORAL at 08:57

## 2021-03-19 RX ADMIN — OXYCODONE HYDROCHLORIDE 5 MILLIGRAM(S): 5 TABLET ORAL at 16:41

## 2021-03-19 RX ADMIN — Medication 500 MILLIGRAM(S): at 18:15

## 2021-03-19 RX ADMIN — Medication 1 TABLET(S): at 21:43

## 2021-03-19 RX ADMIN — Medication 400 MILLIGRAM(S): at 19:15

## 2021-03-19 RX ADMIN — SENNA PLUS 2 TABLET(S): 8.6 TABLET ORAL at 21:43

## 2021-03-19 RX ADMIN — CITALOPRAM 20 MILLIGRAM(S): 10 TABLET, FILM COATED ORAL at 21:43

## 2021-03-19 RX ADMIN — Medication 1000 MILLIGRAM(S): at 19:45

## 2021-03-19 RX ADMIN — OXYCODONE HYDROCHLORIDE 10 MILLIGRAM(S): 5 TABLET ORAL at 20:07

## 2021-03-19 RX ADMIN — ANASTROZOLE 1 MILLIGRAM(S): 1 TABLET ORAL at 21:43

## 2021-03-19 RX ADMIN — LORATADINE 10 MILLIGRAM(S): 10 TABLET ORAL at 21:43

## 2021-03-19 RX ADMIN — OXYCODONE HYDROCHLORIDE 10 MILLIGRAM(S): 5 TABLET ORAL at 21:00

## 2021-03-19 RX ADMIN — TRAMADOL HYDROCHLORIDE 50 MILLIGRAM(S): 50 TABLET ORAL at 23:57

## 2021-03-19 RX ADMIN — Medication 15 MILLIGRAM(S): at 18:27

## 2021-03-19 RX ADMIN — SODIUM CHLORIDE 500 MILLILITER(S): 9 INJECTION, SOLUTION INTRAVENOUS at 12:27

## 2021-03-19 RX ADMIN — SODIUM CHLORIDE 500 MILLILITER(S): 9 INJECTION, SOLUTION INTRAVENOUS at 13:45

## 2021-03-19 RX ADMIN — Medication 650 MILLIGRAM(S): at 08:57

## 2021-03-19 NOTE — DISCHARGE NOTE PROVIDER - NSDCMRMEDTOKEN_GEN_ALL_CORE_FT
acetaminophen 325 mg oral capsule: 3 cap(s) orally every 8 hours, As Needed MDD:9 tablets  Altace 10 mg oral capsule: 1 cap(s) orally once a day  Arimidex 1 mg oral tablet: 1 tab(s) orally once a day  aspirin 81 mg oral delayed release tablet: 1 tab(s) orally 2 times a day MDD:2 tabs  CeleXA 20 mg oral tablet: 1 tab(s) orally once a day  Crestor 20 mg oral tablet: 1 tab(s) orally once a day  Lyrica 75 mg oral capsule: 75 cap(s) orally 2 times a day, As Needed MDD:2 Capsules  Multiple Vitamins oral tablet: 1 tab(s) orally once a day  naproxen 500 mg oral tablet: 1 tab(s) orally 2 times a day, As Needed MDD:2 tablets  omeprazole 40 mg oral delayed release capsule: 1 cap(s) orally once a day MDD:1 capsule  oxyCODONE 5 mg oral tablet: 1 tab(s) orally every 4 hours, As Needed MDD:6 tablets  traMADol 50 mg oral tablet: 1 tab(s) orally every 6 hours, As Needed MDD:4 tablets  Vitamin C 1000 mg oral tablet: 1 tab(s) orally once a day   acetaminophen 325 mg oral capsule: 3 cap(s) orally every 8 hours, As Needed MDD:9 tablets  Altace 10 mg oral capsule: 1 cap(s) orally once a day  Arimidex 1 mg oral tablet: 1 tab(s) orally once a day  CeleXA 20 mg oral tablet: 1 tab(s) orally once a day  Crestor 20 mg oral tablet: 1 tab(s) orally once a day  Eliquis 2.5 mg oral tablet: 1 tab(s) orally 2 times a day MDD:take 2 per day for 30 days  Lyrica 75 mg oral capsule: 75 cap(s) orally 2 times a day, As Needed MDD:2 Capsules  Multiple Vitamins oral tablet: 1 tab(s) orally once a day  naproxen 500 mg oral tablet: 1 tab(s) orally 2 times a day, As Needed MDD:2 tablets  omeprazole 40 mg oral delayed release capsule: 1 cap(s) orally once a day MDD:1 capsule  oxyCODONE 5 mg oral tablet: 1 tab(s) orally every 4 hours, As Needed MDD:6 tablets  traMADol 50 mg oral tablet: 1 tab(s) orally every 6 hours, As Needed MDD:4 tablets  Vitamin C 1000 mg oral tablet: 1 tab(s) orally once a day   acetaminophen 325 mg oral capsule: 3 cap(s) orally every 8 hours, As Needed MDD:9 tablets  Altace 10 mg oral capsule: 1 cap(s) orally once a day  Arimidex 1 mg oral tablet: 1 tab(s) orally once a day  CeleXA 20 mg oral tablet: 1 tab(s) orally once a day  Crestor 20 mg oral tablet: 1 tab(s) orally once a day  Eliquis 2.5 mg oral tablet: 1 tab(s) orally 2 times a day MDD:take 2 per day for 30 days  Lyrica 75 mg oral capsule: 75 cap(s) orally 2 times a day, As Needed MDD:2 Capsules  Multiple Vitamins oral tablet: 1 tab(s) orally once a day  naproxen 500 mg oral tablet: 1 tab(s) orally 2 times a day, As Needed MDD:2 tablets  omeprazole 40 mg oral delayed release capsule: 1 cap(s) orally once a day MDD:1 capsule  oxyCODONE 5 mg oral tablet: 1 tab(s) orally every 4 hours, As Needed MDD:6 tablets  traMADol 50 mg oral tablet: 1 tab(s) orally every 6 hours, As Needed MDD:4 tablets  Vitamin C 1000 mg oral tablet: 1 tab(s) orally once a day  ZyrTEC 10 mg oral tablet: 1 tab(s) orally once a day

## 2021-03-19 NOTE — DISCHARGE NOTE PROVIDER - HOSPITAL COURSE
H&P:  Pt is a 60y Female  PAST MEDICAL & SURGICAL HISTORY:  High cholesterol    HTN (hypertension)    Breast CA    Lymphedema    History of left knee replacement    H/O:     H/O bilateral mastectomy    History of tonsillectomy    History of appendectomy    H/O rhinoplasty           Now s/p Total Knee Arthroplasty. Pt is afebrile with stable vital signs. Pain is controlled. Alert and Oriented. Exam reveals intact EHL FHL TA GS, +DP. Dressing is clean and dry with a New Aquacel bandage on.    Hospital Course:  Patient presented to [facility] medically cleared for elective Knee Replacement Surgery, having failed outpatient conservative management. Prophylactic antibiotics were started before the procedure and continued for 24 hours. They were admitted after surgery to the orthopedic floor.   There were no complications during the hospital stay. All home medications were continued.     Routine consults were obtained from Physical Therapy for PT. Patient was placed on anticoagulation with Aspirin 81 mg.  Pertinent home medications were continued.  Daily labs were followed.      POD 0 pt was stable overnight.  Pt received PT twice daily, and a new Aquacel dressing was applied prior to discharge.  The orthopedic Attending is aware and agrees. H&P:  Pt is a 60y Female  PAST MEDICAL & SURGICAL HISTORY:  High cholesterol    HTN (hypertension)    Breast CA    Lymphedema    History of left knee replacement    H/O:     H/O bilateral mastectomy    History of tonsillectomy    History of appendectomy    H/O rhinoplasty           Now s/p Total Knee Arthroplasty. Pt is afebrile with stable vital signs. Pain is controlled. Alert and Oriented. Exam reveals intact EHL FHL TA GS, +DP. Dressing is clean and dry with a New Aquacel bandage on.    Hospital Course:  Patient presented to [facility] medically cleared for elective Knee Replacement Surgery, having failed outpatient conservative management. Prophylactic antibiotics were started before the procedure and continued for 24 hours. They were admitted after surgery to the orthopedic floor.   There were no complications during the hospital stay. All home medications were continued.     Routine consults were obtained from Physical Therapy for PT. Patient was placed on anticoagulation with eliquis 2.5 mg twice a day for 30 days.  Pertinent home medications were continued.  Daily labs were followed.      POD 0 pt was stable overnight.  Pt received PT twice daily, and a new Aquacel dressing was applied prior to discharge.  The orthopedic Attending is aware and agrees. H&P:  Pt is a 60y Female  PAST MEDICAL & SURGICAL HISTORY:  High cholesterol    HTN (hypertension)    Breast CA    Lymphedema    History of left knee replacement    H/O:     H/O bilateral mastectomy    History of tonsillectomy    History of appendectomy    H/O rhinoplasty         Now s/p Right Total Knee Arthroplasty. Pt is afebrile with stable vital signs. Pain is controlled. Alert and Oriented. Exam reveals intact EHL FHL TA GS, +DP. Dressing is clean and dry with an aquacel bandage on.    Hospital Course:  Patient presented to [facility] medically cleared for elective Right Knee Replacement Surgery, having failed outpatient conservative management. Prophylactic antibiotics were started before the procedure and continued for 24 hours. They were admitted after surgery to the orthopedic floor.   There were no complications during the hospital stay. All home medications were continued.     Routine consults were obtained from Physical Therapy for PT. Patient was placed on anticoagulation with eliquis 2.5 mg twice a day for 30 days. Pertinent home medications were continued.  Daily labs were followed.      POD 0 pt was stable overnight.  Pt received PT twice daily. The orthopedic Attending is aware and agrees.

## 2021-03-19 NOTE — DISCHARGE NOTE PROVIDER - NSDCFUADDINST_GEN_ALL_CORE_FT
1.	Pain Control  2.	Walking with full weight bearing as tolerated, with assistive devices (walker/Cane as Needed)  3.	DVT Prophylaxis- Aspirin 81mg PO BID x 30 days  4.	Tylenol, oxyIR, tramadol as needed for pain; Nexium; Gabapentin.   5.	Follow up with Dr. Antonio as Outpatient in 30 Days after Discharge from the Hospital. Call Office For Appointment.   6.	Follow exercise program given to you by Dr. Antonio.   7.	Ice affected area as Needed  8.	Keep Dressing Clean and dry. Do not remove until POD #7.    1.	Pain Control  2.	Walking with full weight bearing as tolerated, with assistive devices (walker/Cane as Needed)  3.	DVT Prophylaxis- eliquis 2.5 mg twice a day for 30 days  4.	Tylenol, oxyIR, tramadol as needed for pain; Nexium; Gabapentin.   5.	Follow up with Dr. Antonio as Outpatient in 30 Days after Discharge from the Hospital. Call Office For Appointment.   6.	Follow exercise program given to you by Dr. Antonio.   7.	Ice affected area as Needed  8.	Keep Dressing Clean and dry. Do not remove until POD #7.    1.	Pain Control  2.	Walking with full weight bearing as tolerated, with assistive devices (walker/Cane as Needed)  3.	DVT Prophylaxis- Eliquis 2.5mg PO BID x 30 days  4.	Tylenol, oxyIR, tramadol as needed for pain; Nexium; Gabapentin.   5.	Follow up with Dr. Antonio as Outpatient in 14 Days after Discharge from the Hospital. Call Office For Appointment.   6.	Follow exercise program given to you by Dr. Antonio.   7.	Ice affected area as Needed  8.	Keep Dressing Clean and dry. Do not remove until POD #10, please follow the date as written on your aquacel dressing. You may shower with the dressing on, do not submerge in water

## 2021-03-19 NOTE — OCCUPATIONAL THERAPY INITIAL EVALUATION ADULT - ADDITIONAL COMMENTS
Pre op assessment confirmed - Pt had L TKR done on 12/18/20 at [facility] and pt went home with home PT. Patient lives with spouse (Who can assist post op) in a private house with 2 steps (No rails, but steps are small and pt reports that she can place a walker on the second step) to a landing and then another 5 steps with a R rail to reach the front door. Once inside, the patient has 5 steps with a L rail to reach the main floor where the bedroom and bathroom is. The patients bathroom has a tub/shower combination, retractable shower head, standard toilet seat and + Grab bars. The pt has a 3/1 commode at home.

## 2021-03-19 NOTE — DISCHARGE NOTE PROVIDER - CARE PROVIDER_API CALL
Troy Antonio)  Orthopaedic Surgery  [address], [address]  [place] [address]  Phone: [phone]  Fax: [phone]  Follow Up Time:

## 2021-03-19 NOTE — PHYSICAL THERAPY INITIAL EVALUATION ADULT - ADDITIONAL COMMENTS
Verified postop, As per pre-op 12/3/2020 and reviewed with patient today: Pt lives in a private home c 2 small steps, no rails but can fit a rolling walker (Pt reported that she was able to safely negotiate the 2 small steps c the rolling walker I), then 5 steps Alcides rails far apart and a platform step at the top that can fit a RW to enter the house. Inside pt has 5 steps to negotiate with L rail up. BR has tub-shower, grab bar, retractable shower head, standard toilet seat height that can fit 3:1 commode. Pt owns cane and uses for all activities. Wears eyeglasses for reading and distance, is R handed, currently drives, denies use of hearing aids.  Average pain level at rest is 3/10, increases to 6/10 c prolonged standing, after doing PT exercises. Pt denies taking pain meds currently, reports resting helps alleviate pain. Denies h/o adverse reactions to pain meds. Pt stopped outpatient PT 1 week ago but is doing exercises at home, denies h/o falls, denies buckling in right knee.

## 2021-03-19 NOTE — PHYSICAL THERAPY INITIAL EVALUATION ADULT - CRITERIA FOR SKILLED THERAPEUTIC INTERVENTIONS
Home with home PT, pt has DME/impairments found/functional limitations in following categories/risk reduction/prevention/rehab potential/therapy frequency/predicted duration of therapy intervention/anticipated equipment needs at discharge/anticipated discharge recommendation

## 2021-03-19 NOTE — PHYSICAL THERAPY INITIAL EVALUATION ADULT - PLANNED THERAPY INTERVENTIONS, PT EVAL
Pt will be able ot negotiate 1 flight of stairs using righ trail up & down facing the rails sideways, 1 flight of stairs using left rail up, right sided cane, maintaining WB precautions independently in 1 week/balance training/bed mobility training/gait training/strengthening/transfer training

## 2021-03-19 NOTE — PHYSICAL THERAPY INITIAL EVALUATION ADULT - ASSISTIVE DEVICE FOR STAIR TRANSFER, REHAB EVAL
1st- right rail up & down facing the rails sideways, 2nd- Left rail, right cane/straight cane/right rail up/left rail up/right rail down

## 2021-03-19 NOTE — OCCUPATIONAL THERAPY INITIAL EVALUATION ADULT - ASSISTIVE DEVICE FOR TRANSFER: SIT/STAND, REHAB EVAL
Patient arrives to ED via [facility] from [facility] with a CC of AMS. Reports patient was to have dialysis yesterday, but missed due to being altered. Patient has wet lung sounds bilaterally, edema to all 4 extremities, and active shingles to R chest and R upper back. EKG -A.fib per EMS. Daughter at bedside reports patient is DNR and has history of liver and kidney failure. Patient is hypoxic in 80s per EMS-placed on non-rebreather.   
rolling walker

## 2021-03-20 ENCOUNTER — TRANSCRIPTION ENCOUNTER (OUTPATIENT)
Age: 61
End: 2021-03-20

## 2021-03-20 VITALS
DIASTOLIC BLOOD PRESSURE: 64 MMHG | TEMPERATURE: 98 F | SYSTOLIC BLOOD PRESSURE: 134 MMHG | RESPIRATION RATE: 18 BRPM | OXYGEN SATURATION: 98 % | HEART RATE: 68 BPM

## 2021-03-20 LAB
ANION GAP SERPL CALC-SCNC: 6 MMOL/L — SIGNIFICANT CHANGE UP (ref 5–17)
BUN SERPL-MCNC: 18 MG/DL — SIGNIFICANT CHANGE UP (ref 7–23)
CALCIUM SERPL-MCNC: 9.4 MG/DL — SIGNIFICANT CHANGE UP (ref 8.5–10.1)
CHLORIDE SERPL-SCNC: 105 MMOL/L — SIGNIFICANT CHANGE UP (ref 96–108)
CO2 SERPL-SCNC: 26 MMOL/L — SIGNIFICANT CHANGE UP (ref 22–31)
CREAT SERPL-MCNC: 0.89 MG/DL — SIGNIFICANT CHANGE UP (ref 0.5–1.3)
GLUCOSE SERPL-MCNC: 132 MG/DL — HIGH (ref 70–99)
HCT VFR BLD CALC: 40.9 % — SIGNIFICANT CHANGE UP (ref 34.5–45)
HGB BLD-MCNC: 13.2 G/DL — SIGNIFICANT CHANGE UP (ref 11.5–15.5)
MCHC RBC-ENTMCNC: 27.2 PG — SIGNIFICANT CHANGE UP (ref 27–34)
MCHC RBC-ENTMCNC: 32.3 GM/DL — SIGNIFICANT CHANGE UP (ref 32–36)
MCV RBC AUTO: 84.2 FL — SIGNIFICANT CHANGE UP (ref 80–100)
NRBC # BLD: 0 /100 WBCS — SIGNIFICANT CHANGE UP (ref 0–0)
PLATELET # BLD AUTO: 241 K/UL — SIGNIFICANT CHANGE UP (ref 150–400)
POTASSIUM SERPL-MCNC: 4.5 MMOL/L — SIGNIFICANT CHANGE UP (ref 3.5–5.3)
POTASSIUM SERPL-SCNC: 4.5 MMOL/L — SIGNIFICANT CHANGE UP (ref 3.5–5.3)
RBC # BLD: 4.86 M/UL — SIGNIFICANT CHANGE UP (ref 3.8–5.2)
RBC # FLD: 13.6 % — SIGNIFICANT CHANGE UP (ref 10.3–14.5)
SODIUM SERPL-SCNC: 137 MMOL/L — SIGNIFICANT CHANGE UP (ref 135–145)
WBC # BLD: 18.17 K/UL — HIGH (ref 3.8–10.5)
WBC # FLD AUTO: 18.17 K/UL — HIGH (ref 3.8–10.5)

## 2021-03-20 RX ORDER — CETIRIZINE HYDROCHLORIDE 10 MG/1
1 TABLET ORAL
Qty: 0 | Refills: 0 | DISCHARGE

## 2021-03-20 RX ORDER — DIPHENHYDRAMINE HCL 50 MG
25 CAPSULE ORAL EVERY 6 HOURS
Refills: 0 | Status: DISCONTINUED | OUTPATIENT
Start: 2021-03-20 | End: 2021-03-20

## 2021-03-20 RX ADMIN — APIXABAN 2.5 MILLIGRAM(S): 2.5 TABLET, FILM COATED ORAL at 06:25

## 2021-03-20 RX ADMIN — Medication 15 MILLIGRAM(S): at 11:23

## 2021-03-20 RX ADMIN — Medication 1 TABLET(S): at 11:23

## 2021-03-20 RX ADMIN — Medication 1 TABLET(S): at 06:27

## 2021-03-20 RX ADMIN — Medication 500 MILLIGRAM(S): at 06:25

## 2021-03-20 RX ADMIN — CELECOXIB 200 MILLIGRAM(S): 200 CAPSULE ORAL at 06:25

## 2021-03-20 RX ADMIN — TRAMADOL HYDROCHLORIDE 50 MILLIGRAM(S): 50 TABLET ORAL at 00:54

## 2021-03-20 RX ADMIN — Medication 1 MILLIGRAM(S): at 11:23

## 2021-03-20 RX ADMIN — Medication 15 MILLIGRAM(S): at 11:45

## 2021-03-20 RX ADMIN — Medication 400 MILLIGRAM(S): at 10:28

## 2021-03-20 RX ADMIN — Medication 1000 MILLIGRAM(S): at 11:00

## 2021-03-20 RX ADMIN — Medication 100 MILLIGRAM(S): at 02:06

## 2021-03-20 RX ADMIN — SODIUM CHLORIDE 500 MILLILITER(S): 9 INJECTION, SOLUTION INTRAVENOUS at 06:25

## 2021-03-20 RX ADMIN — PANTOPRAZOLE SODIUM 40 MILLIGRAM(S): 20 TABLET, DELAYED RELEASE ORAL at 06:25

## 2021-03-20 RX ADMIN — Medication 25 MILLIGRAM(S): at 02:56

## 2021-03-20 RX ADMIN — Medication 101.6 MILLIGRAM(S): at 06:25

## 2021-03-20 RX ADMIN — Medication 15 MILLIGRAM(S): at 06:40

## 2021-03-20 RX ADMIN — Medication 15 MILLIGRAM(S): at 06:25

## 2021-03-20 RX ADMIN — Medication 15 MILLIGRAM(S): at 00:15

## 2021-03-20 RX ADMIN — OXYCODONE HYDROCHLORIDE 10 MILLIGRAM(S): 5 TABLET ORAL at 13:00

## 2021-03-20 RX ADMIN — LORATADINE 10 MILLIGRAM(S): 10 TABLET ORAL at 11:23

## 2021-03-20 RX ADMIN — Medication 400 MILLIGRAM(S): at 02:56

## 2021-03-20 RX ADMIN — CELECOXIB 200 MILLIGRAM(S): 200 CAPSULE ORAL at 07:21

## 2021-03-20 RX ADMIN — OXYCODONE HYDROCHLORIDE 10 MILLIGRAM(S): 5 TABLET ORAL at 12:07

## 2021-03-20 RX ADMIN — Medication 1000 MILLIGRAM(S): at 03:20

## 2021-03-20 NOTE — PROGRESS NOTE ADULT - SUBJECTIVE AND OBJECTIVE BOX
POD 1 Total Knee Arthroplasty  Pt Seen and Examined. Pain is controlled. Feeling well overall. No nausea or vomiting.     Vital Signs Last 24 Hrs  T(C): 36.3 (20 Mar 2021 04:00), Max: 37 (19 Mar 2021 14:26)  T(F): 97.4 (20 Mar 2021 04:00), Max: 98.6 (19 Mar 2021 14:26)  HR: 67 (20 Mar 2021 04:00) (67 - 91)  BP: 125/80 (20 Mar 2021 04:00) (110/65 - 135/85)  BP(mean): --  RR: 16 (20 Mar 2021 04:00) (13 - 19)  SpO2: 98% (20 Mar 2021 04:00) (94% - 99%)    Exam:  NAD AAOx3  Dressing clean and dry  +EHL FHL TA GS  SILT toes 1-5  +DP  Calf Soft NT    A/P:   60yFemale s/p R Total Knee Arthroplasty  -Pain control  -DVT/PE ppx  - WBAT/PT/OOB  -Med Mgmt  - FU Labs  - D/C Planning home today
Orthopedics Post-op Check    POD 0 Total Knee Arthroplasty  Pt Seen and Examined. Pain is controlled. Feeling well overall. No nausea or vomiting.     Vital Signs Last 24 Hrs  T(C): 36.3 (03-19-21 @ 13:26), Max: 36.7 (03-19-21 @ 08:37)  T(F): 97.4 (03-19-21 @ 13:26), Max: 98.1 (03-19-21 @ 08:37)  HR: 80 (03-19-21 @ 13:26) (74 - 91)  BP: 116/77 (03-19-21 @ 13:26) (110/65 - 131/83)  BP(mean): --  RR: 16 (03-19-21 @ 13:26) (13 - 19)  SpO2: 97% (03-19-21 @ 13:26) (97% - 99%)              Exam:  NAD AAOx3  Dressing clean and dry  +EHL FHL TA GS  SILT toes 1-5  +DP  Calf Soft NT    A/P:   60yFemale s/p R Total Knee Arthroplasty  -Pain control  -DVT/PE ppx  - WBAT/PT/OOB  -Med Mgmt  - FU Labs  - D/C Planning

## 2021-03-20 NOTE — DISCHARGE NOTE NURSING/CASE MANAGEMENT/SOCIAL WORK - PATIENT PORTAL LINK FT
You can access the FollowMyHealth Patient Portal offered by [facility] by registering at the following website: [email or web address]. By joining [facility]’s FollowMyHealth portal, you will also be able to view your health information using other applications (apps) compatible with our system.

## 2021-03-26 DIAGNOSIS — Z92.3 PERSONAL HISTORY OF IRRADIATION: ICD-10-CM

## 2021-03-26 DIAGNOSIS — E78.5 HYPERLIPIDEMIA, UNSPECIFIED: ICD-10-CM

## 2021-03-26 DIAGNOSIS — I10 ESSENTIAL (PRIMARY) HYPERTENSION: ICD-10-CM

## 2021-03-26 DIAGNOSIS — M17.11 UNILATERAL PRIMARY OSTEOARTHRITIS, RIGHT KNEE: ICD-10-CM

## 2021-03-26 DIAGNOSIS — J30.9 ALLERGIC RHINITIS, UNSPECIFIED: ICD-10-CM

## 2021-03-26 DIAGNOSIS — R26.0 ATAXIC GAIT: ICD-10-CM

## 2021-03-26 DIAGNOSIS — Z85.3 PERSONAL HISTORY OF MALIGNANT NEOPLASM OF BREAST: ICD-10-CM

## 2021-03-26 DIAGNOSIS — Z96.652 PRESENCE OF LEFT ARTIFICIAL KNEE JOINT: ICD-10-CM

## 2021-03-26 DIAGNOSIS — Z92.21 PERSONAL HISTORY OF ANTINEOPLASTIC CHEMOTHERAPY: ICD-10-CM

## 2021-04-01 ENCOUNTER — APPOINTMENT (OUTPATIENT)
Dept: ORTHOPEDIC SURGERY | Facility: CLINIC | Age: 61
End: 2021-04-01
Payer: COMMERCIAL

## 2021-04-01 PROCEDURE — 99024 POSTOP FOLLOW-UP VISIT: CPT

## 2021-04-01 PROCEDURE — 73564 X-RAY EXAM KNEE 4 OR MORE: CPT | Mod: 26,RT

## 2021-04-01 NOTE — HISTORY OF PRESENT ILLNESS
[de-identified] : Status-post right total knee arthroplasty here for initial postoperative evaluation. Excellent progress is noted in terms of pain and restoration of function. Pain is well controlled with oral medications. There has been no change in medical health since discharge. The patient does require assistive devices.

## 2021-04-01 NOTE — DISCUSSION/SUMMARY
[de-identified] : The patient is doing well after joint replacement surgery. Written infectious precautions were reviewed. The patient will progress with physical therapy at this time and they will work on transitioning from requiring assistive devices for ambulation. Aspirin therapy will be discontinued at 1 month post surgery for the purpose of orthopedic thromboembolism prophylaxis. Return around the 6 week anniversary from surgery for follow-up evaluation.

## 2021-04-01 NOTE — PHYSICAL EXAM
[de-identified] : Well developed, well nourished in no apparent distress, awake, alert and orientated to person, place and time with appropriate mood and affect\par Respirations are even and unlabored. Gait evaluation does not reveal a limp. There is no inguinal adenopathy. The affected limb is well-perfused with palpable pedal pulse, without skin lesions, shows a grossly normal motor and sensory examination. Incision is healing well. Knee motion is 0-110  [de-identified] : AP, lateral, sunrise knee x-rays of the right knee were ordered and obtained in the office and demonstrate satisfactory position and alignment of the components are present. No signs of loosening are seen.

## 2021-05-05 ENCOUNTER — APPOINTMENT (OUTPATIENT)
Dept: ORTHOPEDIC SURGERY | Facility: CLINIC | Age: 61
End: 2021-05-05
Payer: COMMERCIAL

## 2021-05-05 PROCEDURE — 99024 POSTOP FOLLOW-UP VISIT: CPT

## 2021-05-05 NOTE — DISCUSSION/SUMMARY
[de-identified] : The patient is doing well after joint replacement surgery. Continue to be weight bearing as tolerated without restriction. Follow up is recommended at the 6 month anniversary from surgery.

## 2021-05-05 NOTE — HISTORY OF PRESENT ILLNESS
[de-identified] : This is very nice 60 year female  here for interim evaluation of right total knee arthroplasty. The patient reports good pain relief since surgery in the replaced joint and satisfactory restoration of function in terms of activities of daily living. The patient no longer requires an assistive device for ambulation, does not require pain medication, and completed postoperative physical therapy. They report unlimited activities of daily living and unlimited walking tolerance. The patient is thrilled with their progress from surgery and ultimate outcome.

## 2021-06-10 ENCOUNTER — APPOINTMENT (OUTPATIENT)
Dept: ORTHOPEDIC SURGERY | Facility: CLINIC | Age: 61
End: 2021-06-10
Payer: COMMERCIAL

## 2021-06-10 DIAGNOSIS — Z96.651 PRESENCE OF RIGHT ARTIFICIAL KNEE JOINT: ICD-10-CM

## 2021-06-10 DIAGNOSIS — M17.11 UNILATERAL PRIMARY OSTEOARTHRITIS, RIGHT KNEE: ICD-10-CM

## 2021-06-10 DIAGNOSIS — S80.01XA CONTUSION OF RIGHT KNEE, INITIAL ENCOUNTER: ICD-10-CM

## 2021-06-10 DIAGNOSIS — Z96.652 PRESENCE OF LEFT ARTIFICIAL KNEE JOINT: ICD-10-CM

## 2021-06-10 DIAGNOSIS — Z01.818 ENCOUNTER FOR OTHER PREPROCEDURAL EXAMINATION: ICD-10-CM

## 2021-06-10 PROCEDURE — 99214 OFFICE O/P EST MOD 30 MIN: CPT | Mod: 24

## 2021-06-10 PROCEDURE — 73564 X-RAY EXAM KNEE 4 OR MORE: CPT | Mod: RT

## 2021-06-10 NOTE — PHYSICAL EXAM
[de-identified] : Patient is well nourished, well-developed, in no acute distress, with appropriate mood and affect. The patient is oriented to time, place, and person. Respirations are even and unlabored. Gait evaluation does reveal a limp. There is no inguinal adenopathy. Examination of the contralateral knee shows normal range of motion, strength, no tenderness, and intact skin. The operative limb is well-perfused, has a well appearing surgical scar, and shows a grossly normal motor and sensory examination. Knee motion is painless and the right knee moves from 0 to 125 degrees. The knee is stable within that range-of-motion to AP and ML stress.  Tender to palpation over the posterior lateral aspect the knee over the hamstring tendons.  5-5 strength for hamstring activation.  The alignment of the knee is neutral. Muscle strength is normal. Quadriceps and hamstring muscle strength is normal bilaterally. Pedal pulses are palpable.  [de-identified] : AP, lateral, sunrise knee x-rays of the right knee were ordered and obtained in the office and demonstrate satisfactory position and alignment of the components are present. No signs of loosening are seen.

## 2021-06-10 NOTE — DISCUSSION/SUMMARY
[de-identified] : Acute onset of right knee pain which may represent scar tissue rupture versus hamstring tendon rupture versus a strain of the knee.  The patient is not an appropriate candidate for surgical intervention at this time. An extensive discussion was conducted on the natural history of the disease and the variety of surgical and non-surgical options available to the patient including, but not limited to non-steroidal anti-inflammatory medications, steroid injections, physical therapy, maintenance of ideal body weight, and reduction of activity.  Rest, ice, compression and elevation is recommended.  Meloxicam prescribed.  If no better in 1 month that she will follow-up sooner otherwise if it improves then 3 months.\par

## 2021-06-10 NOTE — HISTORY OF PRESENT ILLNESS
[de-identified] : Is a very pleasant 60-year-old female approximately 3 months status post right total knee arthroplasty.  She has been doing very well healed very well from surgery.  She was doing aggressive physical therapy and overdoing it when she felt a pop in her knee and the posterior lateral aspect of the knee approximately 1 week ago.  Since then she has had increased pain and swelling in the knee.  She is not giving herself a chance to rest.  She has not been icing or compression wraps.  She is not take any NSAIDs.  Ambulating without a cane or walker.

## 2021-06-14 ENCOUNTER — OUTPATIENT (OUTPATIENT)
Dept: OUTPATIENT SERVICES | Facility: HOSPITAL | Age: 61
LOS: 1 days | Discharge: HOME | End: 2021-06-14

## 2021-06-14 VITALS
HEIGHT: 65 IN | RESPIRATION RATE: 16 BRPM | SYSTOLIC BLOOD PRESSURE: 156 MMHG | TEMPERATURE: 99 F | HEART RATE: 71 BPM | DIASTOLIC BLOOD PRESSURE: 90 MMHG | WEIGHT: 187.39 LBS

## 2021-06-14 DIAGNOSIS — Z98.890 OTHER SPECIFIED POSTPROCEDURAL STATES: Chronic | ICD-10-CM

## 2021-06-14 DIAGNOSIS — Z90.49 ACQUIRED ABSENCE OF OTHER SPECIFIED PARTS OF DIGESTIVE TRACT: Chronic | ICD-10-CM

## 2021-06-14 DIAGNOSIS — Z90.13 ACQUIRED ABSENCE OF BILATERAL BREASTS AND NIPPLES: Chronic | ICD-10-CM

## 2021-06-14 DIAGNOSIS — Z01.818 ENCOUNTER FOR OTHER PREPROCEDURAL EXAMINATION: ICD-10-CM

## 2021-06-14 DIAGNOSIS — Z45.2 ENCOUNTER FOR ADJUSTMENT AND MANAGEMENT OF VASCULAR ACCESS DEVICE: ICD-10-CM

## 2021-06-14 DIAGNOSIS — Z90.89 ACQUIRED ABSENCE OF OTHER ORGANS: Chronic | ICD-10-CM

## 2021-06-14 DIAGNOSIS — Z96.652 PRESENCE OF LEFT ARTIFICIAL KNEE JOINT: Chronic | ICD-10-CM

## 2021-06-14 DIAGNOSIS — Z96.651 PRESENCE OF RIGHT ARTIFICIAL KNEE JOINT: Chronic | ICD-10-CM

## 2021-06-14 DIAGNOSIS — Z98.891 HISTORY OF UTERINE SCAR FROM PREVIOUS SURGERY: Chronic | ICD-10-CM

## 2021-06-14 LAB
ANION GAP SERPL CALC-SCNC: 5 MMOL/L — SIGNIFICANT CHANGE UP (ref 5–17)
BUN SERPL-MCNC: 15 MG/DL — SIGNIFICANT CHANGE UP (ref 7–23)
CALCIUM SERPL-MCNC: 9 MG/DL — SIGNIFICANT CHANGE UP (ref 8.5–10.1)
CHLORIDE SERPL-SCNC: 107 MMOL/L — SIGNIFICANT CHANGE UP (ref 96–108)
CO2 SERPL-SCNC: 29 MMOL/L — SIGNIFICANT CHANGE UP (ref 22–31)
CREAT SERPL-MCNC: 0.6 MG/DL — SIGNIFICANT CHANGE UP (ref 0.5–1.3)
GLUCOSE SERPL-MCNC: 73 MG/DL — SIGNIFICANT CHANGE UP (ref 70–99)
HCT VFR BLD CALC: 44.8 % — SIGNIFICANT CHANGE UP (ref 34.5–45)
HGB BLD-MCNC: 14.1 G/DL — SIGNIFICANT CHANGE UP (ref 11.5–15.5)
MCHC RBC-ENTMCNC: 26.3 PG — LOW (ref 27–34)
MCHC RBC-ENTMCNC: 31.5 GM/DL — LOW (ref 32–36)
MCV RBC AUTO: 83.6 FL — SIGNIFICANT CHANGE UP (ref 80–100)
NRBC # BLD: 0 /100 WBCS — SIGNIFICANT CHANGE UP (ref 0–0)
PLATELET # BLD AUTO: 319 K/UL — SIGNIFICANT CHANGE UP (ref 150–400)
POTASSIUM SERPL-MCNC: 3.8 MMOL/L — SIGNIFICANT CHANGE UP (ref 3.5–5.3)
POTASSIUM SERPL-SCNC: 3.8 MMOL/L — SIGNIFICANT CHANGE UP (ref 3.5–5.3)
RBC # BLD: 5.36 M/UL — HIGH (ref 3.8–5.2)
RBC # FLD: 15.1 % — HIGH (ref 10.3–14.5)
SODIUM SERPL-SCNC: 141 MMOL/L — SIGNIFICANT CHANGE UP (ref 135–145)
WBC # BLD: 8.71 K/UL — SIGNIFICANT CHANGE UP (ref 3.8–10.5)
WBC # FLD AUTO: 8.71 K/UL — SIGNIFICANT CHANGE UP (ref 3.8–10.5)

## 2021-06-14 RX ORDER — ASCORBIC ACID 60 MG
1 TABLET,CHEWABLE ORAL
Qty: 0 | Refills: 0 | DISCHARGE

## 2021-06-14 RX ORDER — RAMIPRIL 5 MG
1 CAPSULE ORAL
Qty: 0 | Refills: 0 | DISCHARGE

## 2021-06-14 NOTE — H&P PST ADULT - HISTORY OF PRESENT ILLNESS
60 year old female with a past medial history of breast ca ( 2007, 2008) with lymphedema is scheduled for the removal of an Abuzu-p-yajn on 6/21/201.    Patient denies fever, chills, SOB, recent travel, and recent sick contacts.

## 2021-06-14 NOTE — H&P PST ADULT - ASSESSMENT
60 year old female with a past medial history of breast ca ( , ), Lymphedema, HTN, HLD c/o pain and limited ROM to right knee.  She is scheduled for a right knee replacement on 3/19/2020.    CAPRINI SCORE [CLOT]    AGE RELATED RISK FACTORS                                                       MOBILITY RELATED FACTORS  [ ] Age 41-60 years                                            (1 Point)                  [ ] Bed rest                                                        (1 Point)  [x ] Age: 61-74 years                                           (2 Points)                 [ ] Plaster cast                                                   (2 Points)  [ ] Age= 75 years                                              (3 Points)                 [ ] Bed bound for more than 72 hours                 (2 Points)    DISEASE RELATED RISK FACTORS                                               GENDER SPECIFIC FACTORS  [ ] Edema in the lower extremities                       (1 Point)                  [ ] Pregnancy                                                     (1 Point)  [ ] Varicose veins                                               (1 Point)                  [ ] Post-partum < 6 weeks                                   (1 Point)             [x ] BMI > 25 Kg/m2                                            (1 Point)                  [ ] Hormonal therapy  or oral contraception          (1 Point)                 [ ] Sepsis (in the previous month)                        (1 Point)                  [ ] History of pregnancy complications                 (1 point)  [ ] Pneumonia or serious lung disease                                               [ ] Unexplained or recurrent                     (1 Point)           (in the previous month)                               (1 Point)  [ ] Abnormal pulmonary function test                     (1 Point)                 SURGERY RELATED RISK FACTORS  [ ] Acute myocardial infarction                              (1 Point)                 [ ]  Section                                             (1 Point)  [ ] Congestive heart failure (in the previous month)  (1 Point)               [ ] Minor surgery                                                  (1 Point)   [ ] Inflammatory bowel disease                             (1 Point)                 [ ] Arthroscopic surgery                                        (2 Points)  [ ] Central venous access                                      (2 Points)                [ ] General surgery lasting more than 45 minutes   (2 Points)       [ ] Stroke (in the previous month)                          (5 Points)               [x ] Elective arthroplasty                                         (5 Points)                                                                                                                                               HEMATOLOGY RELATED FACTORS                                                 TRAUMA RELATED RISK FACTORS  [ ] Prior episodes of VTE                                     (3 Points)                [ ] Fracture of the hip, pelvis, or leg                       (5 Points)  [ ] Positive family history for VTE                         (3 Points)                 [ ] Acute spinal cord injury (in the previous month)  (5 Points)  [ ] Prothrombin 12510 A                                     (3 Points)                 [ ] Paralysis  (less than 1 month)                             (5 Points)  [ ] Factor V Leiden                                             (3 Points)                  [ ] Multiple Trauma within 1 month                        (5 Points)  [ ] Lupus anticoagulants                                     (3 Points)                                                           [ ] Anticardiolipin antibodies                               (3 Points)                                                       [ ] High homocysteine in the blood                      (3 Points)                                             [ ] Other congenital or acquired thrombophilia      (3 Points)                                                [ ] Heparin induced thrombocytopenia                  (3 Points)                                          Total Score [    8     ]    Caprini Score 0 - 2:  Low Risk, No VTE Prophylaxis required for most patients, encourage ambulation  Caprini Score 3 - 6:  At Risk, pharmacologic VTE prophylaxis is indicated for most patients (in the absence of a contraindication)  Caprini Score Greater than or = 7:  High Risk, pharmacologic VTE prophylaxis is indicated for most patients (in the absence of a contraindication)    Caprini score indicates that the patient is high risk for VTE event ( score 6 or greater). Surgical patient's in this group will benefit from both pharmacologic prophylaxis and intermittent compression devices . Surgical team will determine the balance between VTE  risk and bleeding risk and other clinical considerations      60 year old female with a past medial history of breast ca ( , ) with lymphedema is scheduled for the removal of an Ilrrn-s-jwtm on .    CAPRINI SCORE [CLOT]    AGE RELATED RISK FACTORS                                                       MOBILITY RELATED FACTORS  [ ] Age 41-60 years                                            (1 Point)                  [ ] Bed rest                                                        (1 Point)  [x ] Age: 61-74 years                                           (2 Points)                 [ ] Plaster cast                                                   (2 Points)  [ ] Age= 75 years                                              (3 Points)                 [ ] Bed bound for more than 72 hours                 (2 Points)    DISEASE RELATED RISK FACTORS                                               GENDER SPECIFIC FACTORS  [ ] Edema in the lower extremities                       (1 Point)                  [ ] Pregnancy                                                     (1 Point)  [ ] Varicose veins                                               (1 Point)                  [ ] Post-partum < 6 weeks                                   (1 Point)             [x ] BMI > 25 Kg/m2                                            (1 Point)                  [ ] Hormonal therapy  or oral contraception          (1 Point)                 [ ] Sepsis (in the previous month)                        (1 Point)                  [ ] History of pregnancy complications                 (1 point)  [ ] Pneumonia or serious lung disease                                               [ ] Unexplained or recurrent                     (1 Point)           (in the previous month)                               (1 Point)  [ ] Abnormal pulmonary function test                     (1 Point)                 SURGERY RELATED RISK FACTORS  [ ] Acute myocardial infarction                              (1 Point)                 [ ]  Section                                             (1 Point)  [ ] Congestive heart failure (in the previous month)  (1 Point)               [ ] Minor surgery                                                  (1 Point)   [ ] Inflammatory bowel disease                             (1 Point)                 [ ] Arthroscopic surgery                                        (2 Points)  [ ] Central venous access                                      (2 Points)                [ x General surgery lasting more than 45 minutes   (2 Points)       [ ] Stroke (in the previous month)                          (5 Points)               [] Elective arthroplasty                                         (5 Points)                                                                                                                                               HEMATOLOGY RELATED FACTORS                                                 TRAUMA RELATED RISK FACTORS  [ ] Prior episodes of VTE                                     (3 Points)                [ ] Fracture of the hip, pelvis, or leg                       (5 Points)  [ ] Positive family history for VTE                         (3 Points)                 [ ] Acute spinal cord injury (in the previous month)  (5 Points)  [ ] Prothrombin 18996 A                                     (3 Points)                 [ ] Paralysis  (less than 1 month)                             (5 Points)  [ ] Factor V Leiden                                             (3 Points)                  [ ] Multiple Trauma within 1 month                        (5 Points)  [ ] Lupus anticoagulants                                     (3 Points)                                                           [ ] Anticardiolipin antibodies                               (3 Points)                                                       [ ] High homocysteine in the blood                      (3 Points)                                             [ ] Other congenital or acquired thrombophilia      (3 Points)                                                [ ] Heparin induced thrombocytopenia                  (3 Points)                                          Total Score [    5   ]    Caprini Score 0 - 2:  Low Risk, No VTE Prophylaxis required for most patients, encourage ambulation  Caprini Score 3 - 6:  At Risk, pharmacologic VTE prophylaxis is indicated for most patients (in the absence of a contraindication)  Caprini Score Greater than or = 7:  High Risk, pharmacologic VTE prophylaxis is indicated for most patients (in the absence of a contraindication)

## 2021-06-14 NOTE — H&P PST ADULT - NSICDXPROBLEM_GEN_ALL_CORE_FT
PROBLEM DIAGNOSES  Problem: Primary osteoarthritis of right knee  Assessment and Plan: Right total knee replacement     Problem: Preoperative examination  Assessment and Plan: labs - cbc,pt/ptt,bmp,t&s,nose cx,ekg  M/C required  preop 3 day hibiclens instruction reviewed and given .instructed on if  nose cx positive use mupuricin 5 days and checklist given  take routine meds DOS with sips of water. avoid NSAID and OTC supplements. verbalized understanding  information on proper nutrition , increase protein and better food choices provided in packet        PROBLEM DIAGNOSES  Problem: Encounter for adjustment and management of vascular access device  Assessment and Plan: Removal of infusaport

## 2021-06-17 ENCOUNTER — FORM ENCOUNTER (OUTPATIENT)
Age: 61
End: 2021-06-17

## 2021-06-20 ENCOUNTER — TRANSCRIPTION ENCOUNTER (OUTPATIENT)
Age: 61
End: 2021-06-20

## 2021-06-21 ENCOUNTER — RESULT REVIEW (OUTPATIENT)
Age: 61
End: 2021-06-21

## 2021-06-21 ENCOUNTER — OUTPATIENT (OUTPATIENT)
Dept: OUTPATIENT SERVICES | Facility: HOSPITAL | Age: 61
LOS: 1 days | Discharge: HOME | End: 2021-06-21
Payer: COMMERCIAL

## 2021-06-21 VITALS
TEMPERATURE: 98 F | OXYGEN SATURATION: 94 % | RESPIRATION RATE: 16 BRPM | SYSTOLIC BLOOD PRESSURE: 119 MMHG | HEART RATE: 56 BPM | DIASTOLIC BLOOD PRESSURE: 54 MMHG

## 2021-06-21 VITALS
SYSTOLIC BLOOD PRESSURE: 134 MMHG | DIASTOLIC BLOOD PRESSURE: 85 MMHG | RESPIRATION RATE: 16 BRPM | HEART RATE: 72 BPM | TEMPERATURE: 98 F

## 2021-06-21 DIAGNOSIS — Z96.652 PRESENCE OF LEFT ARTIFICIAL KNEE JOINT: Chronic | ICD-10-CM

## 2021-06-21 DIAGNOSIS — Z90.89 ACQUIRED ABSENCE OF OTHER ORGANS: Chronic | ICD-10-CM

## 2021-06-21 DIAGNOSIS — Z96.651 PRESENCE OF RIGHT ARTIFICIAL KNEE JOINT: Chronic | ICD-10-CM

## 2021-06-21 DIAGNOSIS — Z90.13 ACQUIRED ABSENCE OF BILATERAL BREASTS AND NIPPLES: Chronic | ICD-10-CM

## 2021-06-21 DIAGNOSIS — Z98.890 OTHER SPECIFIED POSTPROCEDURAL STATES: Chronic | ICD-10-CM

## 2021-06-21 DIAGNOSIS — Z98.891 HISTORY OF UTERINE SCAR FROM PREVIOUS SURGERY: Chronic | ICD-10-CM

## 2021-06-21 DIAGNOSIS — Z90.49 ACQUIRED ABSENCE OF OTHER SPECIFIED PARTS OF DIGESTIVE TRACT: Chronic | ICD-10-CM

## 2021-06-21 PROCEDURE — 88305 TISSUE EXAM BY PATHOLOGIST: CPT | Mod: 26

## 2021-06-21 PROCEDURE — 88300 SURGICAL PATH GROSS: CPT | Mod: 26,59

## 2021-06-21 RX ORDER — ANASTROZOLE 1 MG/1
1 TABLET ORAL
Qty: 0 | Refills: 0 | DISCHARGE

## 2021-06-21 RX ORDER — FENTANYL CITRATE 50 UG/ML
25 INJECTION INTRAVENOUS
Refills: 0 | Status: DISCONTINUED | OUTPATIENT
Start: 2021-06-21 | End: 2021-06-22

## 2021-06-21 RX ORDER — SODIUM CHLORIDE 9 MG/ML
1000 INJECTION, SOLUTION INTRAVENOUS
Refills: 0 | Status: DISCONTINUED | OUTPATIENT
Start: 2021-06-21 | End: 2021-06-22

## 2021-06-21 RX ORDER — ACETAMINOPHEN 500 MG
1000 TABLET ORAL ONCE
Refills: 0 | Status: COMPLETED | OUTPATIENT
Start: 2021-06-21 | End: 2021-06-21

## 2021-06-21 RX ORDER — SODIUM CHLORIDE 9 MG/ML
3 INJECTION INTRAMUSCULAR; INTRAVENOUS; SUBCUTANEOUS EVERY 8 HOURS
Refills: 0 | Status: DISCONTINUED | OUTPATIENT
Start: 2021-06-21 | End: 2021-06-21

## 2021-06-21 RX ADMIN — SODIUM CHLORIDE 75 MILLILITER(S): 9 INJECTION, SOLUTION INTRAVENOUS at 17:46

## 2021-06-21 NOTE — ASU DISCHARGE PLAN (ADULT/PEDIATRIC) - CARE PROVIDER_API CALL
Rosa Guardado)  Surgery  [address], [address]  [place] [address]  Phone: [phone]  Fax: [phone]  Established Patient  Follow Up Time:

## 2021-06-21 NOTE — ASU PATIENT PROFILE, ADULT - PSH
H/O bilateral mastectomy    H/O rhinoplasty    H/O total knee replacement, right  3/2021  H/O:     History of appendectomy    History of left knee replacement  2020  History of tonsillectomy

## 2021-06-23 LAB — SURGICAL PATHOLOGY STUDY: SIGNIFICANT CHANGE UP

## 2021-06-28 DIAGNOSIS — Z45.2 ENCOUNTER FOR ADJUSTMENT AND MANAGEMENT OF VASCULAR ACCESS DEVICE: ICD-10-CM

## 2021-06-28 DIAGNOSIS — E78.00 PURE HYPERCHOLESTEROLEMIA, UNSPECIFIED: ICD-10-CM

## 2021-06-28 DIAGNOSIS — Z85.3 PERSONAL HISTORY OF MALIGNANT NEOPLASM OF BREAST: ICD-10-CM

## 2021-06-28 DIAGNOSIS — Z96.653 PRESENCE OF ARTIFICIAL KNEE JOINT, BILATERAL: ICD-10-CM

## 2021-06-28 DIAGNOSIS — I10 ESSENTIAL (PRIMARY) HYPERTENSION: ICD-10-CM

## 2021-06-28 DIAGNOSIS — Z90.13 ACQUIRED ABSENCE OF BILATERAL BREASTS AND NIPPLES: ICD-10-CM

## 2021-06-28 DIAGNOSIS — Z79.1 LONG TERM (CURRENT) USE OF NON-STEROIDAL ANTI-INFLAMMATORIES (NSAID): ICD-10-CM

## 2021-07-26 NOTE — ASU PREOP CHECKLIST - BOWEL PREP
25 yo  @21w3d w/ ADIN 12/3/21 by LMP and c/w 1st trimester sonogram presents to L&D s/p mechanical fall at 1630. She tripped and fell on her deck, falling on her right side. Did not directly hit her abdomen. No loss of consciousness. Reports some right sided abdominal pain, irregular, cramping 5/10 intensity. Pain improved by Tylenol in ED. Good fetal movements. Denies LOF, VB. Denies fever, chills, CP, SOB, N/V, diarrhea/constipation, dysuria, urgency/frequency, LE pain. Last BM in AM, last PO intake a few hours ago, last intercourse 2wks ago. No complications during this pregnancy. Follows at [facility] but desires to transfer to [facility]. GBS unknown.    
n/a

## 2021-11-14 ENCOUNTER — NON-APPOINTMENT (OUTPATIENT)
Age: 61
End: 2021-11-14

## 2021-11-18 ENCOUNTER — APPOINTMENT (OUTPATIENT)
Dept: ORTHOPEDIC SURGERY | Facility: CLINIC | Age: 61
End: 2021-11-18

## 2022-03-16 ENCOUNTER — RESULT REVIEW (OUTPATIENT)
Age: 62
End: 2022-03-16

## 2022-03-30 ENCOUNTER — RESULT REVIEW (OUTPATIENT)
Age: 62
End: 2022-03-30

## 2022-03-31 ENCOUNTER — ASOB RESULT (OUTPATIENT)
Age: 62
End: 2022-03-31

## 2022-03-31 ENCOUNTER — APPOINTMENT (OUTPATIENT)
Dept: OBGYN | Facility: CLINIC | Age: 62
End: 2022-03-31
Payer: COMMERCIAL

## 2022-03-31 PROCEDURE — 76830 TRANSVAGINAL US NON-OB: CPT

## 2022-08-03 ENCOUNTER — RESULT REVIEW (OUTPATIENT)
Age: 62
End: 2022-08-03

## 2022-08-04 ENCOUNTER — ASOB RESULT (OUTPATIENT)
Age: 62
End: 2022-08-04

## 2022-08-04 ENCOUNTER — APPOINTMENT (OUTPATIENT)
Dept: OBGYN | Facility: CLINIC | Age: 62
End: 2022-08-04

## 2022-08-04 PROCEDURE — 76830 TRANSVAGINAL US NON-OB: CPT

## 2022-08-10 ENCOUNTER — OUTPATIENT (OUTPATIENT)
Dept: OUTPATIENT SERVICES | Facility: HOSPITAL | Age: 62
LOS: 1 days | End: 2022-08-10

## 2022-08-10 VITALS
WEIGHT: 192.02 LBS | HEIGHT: 65 IN | TEMPERATURE: 98 F | OXYGEN SATURATION: 97 % | HEART RATE: 64 BPM | RESPIRATION RATE: 14 BRPM | SYSTOLIC BLOOD PRESSURE: 130 MMHG | DIASTOLIC BLOOD PRESSURE: 80 MMHG

## 2022-08-10 DIAGNOSIS — Z92.21 PERSONAL HISTORY OF ANTINEOPLASTIC CHEMOTHERAPY: Chronic | ICD-10-CM

## 2022-08-10 DIAGNOSIS — Z90.89 ACQUIRED ABSENCE OF OTHER ORGANS: Chronic | ICD-10-CM

## 2022-08-10 DIAGNOSIS — N95.0 POSTMENOPAUSAL BLEEDING: ICD-10-CM

## 2022-08-10 DIAGNOSIS — Z90.13 ACQUIRED ABSENCE OF BILATERAL BREASTS AND NIPPLES: Chronic | ICD-10-CM

## 2022-08-10 DIAGNOSIS — Z98.890 OTHER SPECIFIED POSTPROCEDURAL STATES: Chronic | ICD-10-CM

## 2022-08-10 DIAGNOSIS — Z98.891 HISTORY OF UTERINE SCAR FROM PREVIOUS SURGERY: Chronic | ICD-10-CM

## 2022-08-10 DIAGNOSIS — Z96.651 PRESENCE OF RIGHT ARTIFICIAL KNEE JOINT: Chronic | ICD-10-CM

## 2022-08-10 DIAGNOSIS — Z90.49 ACQUIRED ABSENCE OF OTHER SPECIFIED PARTS OF DIGESTIVE TRACT: Chronic | ICD-10-CM

## 2022-08-10 DIAGNOSIS — Z96.652 PRESENCE OF LEFT ARTIFICIAL KNEE JOINT: Chronic | ICD-10-CM

## 2022-08-10 LAB
ANION GAP SERPL CALC-SCNC: 9 MMOL/L — SIGNIFICANT CHANGE UP (ref 7–14)
BUN SERPL-MCNC: 17 MG/DL — SIGNIFICANT CHANGE UP (ref 7–23)
CALCIUM SERPL-MCNC: 9.1 MG/DL — SIGNIFICANT CHANGE UP (ref 8.4–10.5)
CHLORIDE SERPL-SCNC: 108 MMOL/L — HIGH (ref 98–107)
CO2 SERPL-SCNC: 27 MMOL/L — SIGNIFICANT CHANGE UP (ref 22–31)
CREAT SERPL-MCNC: 0.79 MG/DL — SIGNIFICANT CHANGE UP (ref 0.5–1.3)
EGFR: 84 ML/MIN/1.73M2 — SIGNIFICANT CHANGE UP
GLUCOSE SERPL-MCNC: 110 MG/DL — HIGH (ref 70–99)
HCT VFR BLD CALC: 46.8 % — HIGH (ref 34.5–45)
HGB BLD-MCNC: 14.5 G/DL — SIGNIFICANT CHANGE UP (ref 11.5–15.5)
MCHC RBC-ENTMCNC: 27.4 PG — SIGNIFICANT CHANGE UP (ref 27–34)
MCHC RBC-ENTMCNC: 31 GM/DL — LOW (ref 32–36)
MCV RBC AUTO: 88.5 FL — SIGNIFICANT CHANGE UP (ref 80–100)
NRBC # BLD: 0 /100 WBCS — SIGNIFICANT CHANGE UP
NRBC # FLD: 0 K/UL — SIGNIFICANT CHANGE UP
PLATELET # BLD AUTO: 244 K/UL — SIGNIFICANT CHANGE UP (ref 150–400)
POTASSIUM SERPL-MCNC: 4.1 MMOL/L — SIGNIFICANT CHANGE UP (ref 3.5–5.3)
POTASSIUM SERPL-SCNC: 4.1 MMOL/L — SIGNIFICANT CHANGE UP (ref 3.5–5.3)
RBC # BLD: 5.29 M/UL — HIGH (ref 3.8–5.2)
RBC # FLD: 14.7 % — HIGH (ref 10.3–14.5)
SODIUM SERPL-SCNC: 144 MMOL/L — SIGNIFICANT CHANGE UP (ref 135–145)
WBC # BLD: 7.58 K/UL — SIGNIFICANT CHANGE UP (ref 3.8–10.5)
WBC # FLD AUTO: 7.58 K/UL — SIGNIFICANT CHANGE UP (ref 3.8–10.5)

## 2022-08-10 PROCEDURE — 93010 ELECTROCARDIOGRAM REPORT: CPT

## 2022-08-10 RX ORDER — MELOXICAM 15 MG/1
1 TABLET ORAL
Qty: 0 | Refills: 0 | DISCHARGE

## 2022-08-10 RX ORDER — ROSUVASTATIN CALCIUM 5 MG/1
1 TABLET ORAL
Qty: 0 | Refills: 0 | DISCHARGE

## 2022-08-10 RX ORDER — SODIUM CHLORIDE 9 MG/ML
1000 INJECTION, SOLUTION INTRAVENOUS
Refills: 0 | Status: DISCONTINUED | OUTPATIENT
Start: 2022-08-15 | End: 2022-08-29

## 2022-08-10 RX ORDER — CITALOPRAM 10 MG/1
1 TABLET, FILM COATED ORAL
Qty: 0 | Refills: 0 | DISCHARGE

## 2022-08-10 NOTE — H&P PST ADULT - ATTENDING PHYSICIAN (PRINT):______________________________ DATE:_______ TIME:_______
Per Pt mother call there were no changes to med, dosage, sig or quantity.   The medication(s) set up as pending and waiting for your approval.  Preferred Pharmacy has been set up and verified     Statement Selected

## 2022-08-10 NOTE — H&P PST ADULT - PROBLEM SELECTOR PLAN 1
Pt scheduled for dilation curettage hysteroscopy on 8/15/2022.  labs done results pending, ekg done.  Pt instructed to obtain preop covid testing.  Preop teaching done pt able to verbalize understanding.  Preop teaching done, pt able to verbalize understanding.   medication day of procedure- pepcid  anesthesia- hx of sleep apnea in the past retested no prescription given   pst request  echo 2021 [phone]  sleep study 2017 in chart

## 2022-08-10 NOTE — H&P PST ADULT - ATTENDING COMMENTS
Patient with second episode of PMB- 3/22 nl endometrial biopsy and endosee hysteroscopy in office-- for dilatation and curettage hysteroscopy for second episode with nl endometrial lining and ovaries on ultrasound. d/w patient in office R/B/A including but not limited to infection, bleeding, injury to other organs.

## 2022-08-10 NOTE — H&P PST ADULT - NSICDXPASTSURGICALHX_GEN_ALL_CORE_FT
PAST SURGICAL HISTORY:  H/O bilateral mastectomy     H/O rhinoplasty     H/O total knee replacement, right 3/2021    H/O:      History of appendectomy     History of left knee replacement 2020    History of tonsillectomy      PAST SURGICAL HISTORY:  H/O bilateral mastectomy right , left 2008    H/O rhinoplasty 1981    H/O total knee replacement, right 3/2021    H/O:      History of appendectomy     History of left knee replacement 2020    History of tonsillectomy     Status post chemotherapy infusaport removed 2021

## 2022-08-10 NOTE — H&P PST ADULT - NSANTHOSAYNRD_GEN_A_CORE
pt reports was tested many yrs ago which tested positive was given a cpap machine, then was retested 2017 reports negative sleep study, now not using cpap machine/No. SUBHA screening performed.  STOP BANG Legend: 0-2 = LOW Risk; 3-4 = INTERMEDIATE Risk; 5-8 = HIGH Risk

## 2022-08-10 NOTE — H&P PST ADULT - NSICDXPASTMEDICALHX_GEN_ALL_CORE_FT
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

## 2022-08-10 NOTE — H&P PST ADULT - HISTORY OF PRESENT ILLNESS
62y/o female scheduled for dilation curettage hysteroscopy on 8/15/2022.  Pt states, "hx of breast cancer, s/p right breast mastectomy 2007 followed by chemo and radiation, Tamoxifen, then Arimidex, then developed left breast cancer, s/p mastectomy chem and radiation bilateral upper extremity lymphedema, approx 6 months noticed spotting, US, endometrial bx neg, several weeks ago spotting returned."

## 2022-08-10 NOTE — H&P PST ADULT - RESPIRATORY
normal/clear to auscultation bilaterally/no wheezes/no rales/no rhonchi/no respiratory distress/no use of accessory muscles/no subcutaneous emphysema/airway patent/breath sounds equal/good air movement/respirations non-labored/no intercostal retractions

## 2022-08-10 NOTE — H&P PST ADULT - GASTROINTESTINAL
negative soft/nontender/nondistended/normal active bowel sounds/no rigidity/no organomegaly/no palpable soo/no masses palpable

## 2022-08-12 LAB — SARS-COV-2 RNA SPEC QL NAA+PROBE: SIGNIFICANT CHANGE UP

## 2022-08-12 NOTE — ASU PATIENT PROFILE, ADULT - MENTAL HEALTH CONDITIONS/SYMPTOMS, PROFILE
Pt just needs renal function panel and PTH, correct? They are already pending. It looks like she does use [facility] though so I will need to change renal function panel to BMP and phosphorus. anxiety disorder

## 2022-08-12 NOTE — ASU PATIENT PROFILE, ADULT - HISTORY OF COVID-19 VACCINATION
Patient:   SONNY THAYER            MRN: [facility] [place]-[id number]            FIN: [id number]              Age:   56 years     Sex:  FEMALE     :  60   Associated Diagnoses:   None   Author:   JACLYN FELIX     Date of Operation: 20  Preoperative diagnosis:   1.  Dysmenorrhea  2.  Fibroids  3. Possible Adenomyosis  Postoperative Diagnosis:   Same  Procedure:   1.  Total  Laparoscopic hysterectomy  2.  Bilateral salpingectomy    3.  Right oophorectomy  Surgeon:  PAPI Narayan MD  Assistant:   Jaclyn Diez MD PGY4  (Task: entry, closure, half hysterectomy)  Judy Cabral DO PGY2  (Task: manipulation)  Anesthesia Type:  General  Specimen Removed:   uterus, cervix, bilateral tubes, right ovary  Estimated Blood Loss:   150ml  Grafts or Implants:   None  Fluids:   2000ml  Blood Administered:    none  Urine Output:   150ml  Prophylaxis:   SCDs  Ancef  Complications:   None  Indications:   57 yo P1 with severe dysmenorrhea and fibroids, concern for adenomyosis and degenerating fibroid. Patient desires to proceed with surgical management by Total  Laparoscopic hysterectomy with bilateral salpingectomy .Risks, benefits, alternatives discussed with patient. Risks discussed with patient included risk of bleeding, infection, and damage to surrounding tissues including bowel, bladder, and ureters. Questions answered. Consent signed and  in the chart.  Findings:  1. Enlarged 6w size fibroid  uterus  2. postmenopausal atrophic ovaries  3. Left ovary enveloped in adhesions to left pelvic side wall  4. Left tube with adhesions to left posterior uterus  5. Adhesions of rectosigmoid colon to left posterior uterine surface  6. Right ANDREI pedunculated fibroid  Procedure:    Patient was taken to the OR where SCDs boots were placed, a pre-op brief was completed and general anesthesia was performed without difficulty.  Pt was placed in dorsal lithotomy position with tucked arms. She was prepped and draped in the usual fashion.  A time  out was performed and a mathews catheter was placed. A weighted speculum was placed in posterior vagina, right angle retractor placed in anterior vagina.  The anterior cervix was grasped  with a single toothed tenaculum.  The cervix was dilated to 7mm using Hegar dilators.  The anthony-venita manupilator was placed in the usual fashion.  All other instruments were removed from the vagina.     Attention was then turned to the abdomen where a 5mm incision was made at palmers point  The veress needle was introduced and saline was placed noting easy flow of fluid and gas was connected noting appropriate drop of pressure. The abdominal cavity was insufflated and then an 5mm trocar was introduced under direct visualization.  Two additional 5mm incisions were made bilaterally, medial and superior to the anterior superior iliac crest and  5mm trocars were placed under direct visualization.  Findings as described above.  A grasper was advanced through a side trochar sleeve. The adhesions of the rectosigmoid to the colon posteriorly were carefully  with the ligasure and with blunt dissection.       At this time, Dr. Villatoro was called for an intraop consult. He confirmed that there was no bowel present in the adhesion from the sidewall to the uterus. Left ovary was identified within the adhesions.  A Ligasure device was advanced and used to  cauterized and cut along the mesosalpinx of the left fallopian tube to the proximal end of the isthmus. The tube was removed from the abdomen though the 5mm port.  Good hemostasis was noted.      The patient's left round ligament was identified and held for visualization.   The left round ligament was then grasped and cauterized and transected with the LigaSure.  The broad ligaments anterior sheath was then cauterized and cut to the level of the utero-cervical junction. A bladder flap was made from the left with careful dissection of the peritoneum and cautery/ligation with the LigaSure.    Attention was turned to the right side. The right round ligament was grasped and cauterized and transected with the LigaSure.  The broad ligaments anterior sheath was then cauterized and cut to the level of the utero-cervical junction. A bladder flap was completed from the right with careful dissection of the peritoneum and cautery/ligation with the LigaSure.  The right infundibulopelvic ligament was identified and clamped, cauterized, and cut with the ligasure away from the ureter. The fallopian tube with ovary was cauterized and cut along the mesosalpinx to the proximal end of the isthmus. The tube was removed from the abdomen though the 5mm port.    The bladder was dissected well away from the lower uterine segment and from the ANDREI fibroid.  Additional tissue was dissected and cauterized until the anterior cervical fascia was exposed. The right and left uterine arteries were then skeletonized, serially cauterized and cut.   Careful manipulation of the uterus was then performed with the Freda Chandler, taking care to have the colpotomizer appropriately positioned at the intended area for the vaginal cuff.  The monopolar cautery was then used to make a colpotomy around the colpotomizer circumferentially without difficulty.  Hemostasis was noted.    Attention was then turned to the vagina, where the manipulator was removed from the uterus.  The cervix was grasped with a single toothed tenaculum and then delivered transvaginally.    The vaginal cuff was repaired vaginally in a vertical running stitch with an 0-0 vicryl in a running, locked fashion.   Attention was returned back to the the abdomen.  The pelvis was reassessed and copiously irrigated.  Areas of bleeding were cauterized with the monopolar spatula. An additional figure of eight stitch was placed vaginally in the central portion of the colpotomy closure at an area  of bleeding.Improved hemostasis was noted. The pelvis was reassessed and copiously irrigated. Due  to inability to identify the left ureter clearly, decision was made  to not proceed with the left oophorectomy.       The abdomen was then desufflated, and the trocars were removed. The skin incisions were closed with 4-0 plain gut and dermabond.   All instruments were removed from vagina. Sponge, instrument and needle counts were correct.   The integrity of all instruments was examined at the end of the procedure and confirmed to be intact.  The patient tolerated the procedure well and was taken to the recovery room in stable condition.  Mor Diez MD  OB/GYN PGY4   Yes

## 2022-08-12 NOTE — ASU PATIENT PROFILE, ADULT - VISION (WITH CORRECTIVE LENSES IF THE PATIENT USUALLY WEARS THEM):
wears glases/Normal vision: sees adequately in most situations; can see medication labels, newsprint

## 2022-08-12 NOTE — ASU PATIENT PROFILE, ADULT - NSSUBSTANCEUSE_GEN_ALL_CORE_SD
Dr. Cristin Stratton refilled the patients Paroxetine 12/31/18 with a quantity of 90 and 0 refills. Called patient to inform her of this, and that she should have enough Paroxetine to last through the end of March, patient verbalized understanding and she stated that she would look for it. street drug/inhalant/medication abuse/caffeine

## 2022-08-12 NOTE — ASU PATIENT PROFILE, ADULT - NSICDXPASTMEDICALHX_GEN_ALL_CORE_FT
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

## 2022-08-12 NOTE — ASU PATIENT PROFILE, ADULT - NSICDXPASTSURGICALHX_GEN_ALL_CORE_FT
PAST SURGICAL HISTORY:  H/O bilateral mastectomy right , left 2008    H/O rhinoplasty 1981    H/O total knee replacement, right 3/2021    H/O:      History of appendectomy     History of left knee replacement 2020    History of tonsillectomy     Status post chemotherapy infusaport removed 2021

## 2022-08-14 ENCOUNTER — TRANSCRIPTION ENCOUNTER (OUTPATIENT)
Age: 62
End: 2022-08-14

## 2022-08-15 ENCOUNTER — OUTPATIENT (OUTPATIENT)
Dept: OUTPATIENT SERVICES | Facility: HOSPITAL | Age: 62
LOS: 1 days | Discharge: HOME | End: 2022-08-15

## 2022-08-15 ENCOUNTER — RESULT REVIEW (OUTPATIENT)
Age: 62
End: 2022-08-15

## 2022-08-15 ENCOUNTER — TRANSCRIPTION ENCOUNTER (OUTPATIENT)
Age: 62
End: 2022-08-15

## 2022-08-15 VITALS
HEIGHT: 65 IN | RESPIRATION RATE: 16 BRPM | TEMPERATURE: 99 F | OXYGEN SATURATION: 98 % | DIASTOLIC BLOOD PRESSURE: 81 MMHG | WEIGHT: 192.02 LBS | HEART RATE: 55 BPM | SYSTOLIC BLOOD PRESSURE: 138 MMHG

## 2022-08-15 VITALS
SYSTOLIC BLOOD PRESSURE: 122 MMHG | HEART RATE: 60 BPM | RESPIRATION RATE: 16 BRPM | DIASTOLIC BLOOD PRESSURE: 62 MMHG | OXYGEN SATURATION: 98 %

## 2022-08-15 DIAGNOSIS — Z90.13 ACQUIRED ABSENCE OF BILATERAL BREASTS AND NIPPLES: Chronic | ICD-10-CM

## 2022-08-15 DIAGNOSIS — Z98.891 HISTORY OF UTERINE SCAR FROM PREVIOUS SURGERY: Chronic | ICD-10-CM

## 2022-08-15 DIAGNOSIS — Z90.89 ACQUIRED ABSENCE OF OTHER ORGANS: Chronic | ICD-10-CM

## 2022-08-15 DIAGNOSIS — Z92.21 PERSONAL HISTORY OF ANTINEOPLASTIC CHEMOTHERAPY: Chronic | ICD-10-CM

## 2022-08-15 DIAGNOSIS — Z90.49 ACQUIRED ABSENCE OF OTHER SPECIFIED PARTS OF DIGESTIVE TRACT: Chronic | ICD-10-CM

## 2022-08-15 DIAGNOSIS — N95.0 POSTMENOPAUSAL BLEEDING: ICD-10-CM

## 2022-08-15 DIAGNOSIS — Z96.652 PRESENCE OF LEFT ARTIFICIAL KNEE JOINT: Chronic | ICD-10-CM

## 2022-08-15 DIAGNOSIS — Z98.890 OTHER SPECIFIED POSTPROCEDURAL STATES: Chronic | ICD-10-CM

## 2022-08-15 DIAGNOSIS — Z96.651 PRESENCE OF RIGHT ARTIFICIAL KNEE JOINT: Chronic | ICD-10-CM

## 2022-08-15 PROCEDURE — 88305 TISSUE EXAM BY PATHOLOGIST: CPT | Mod: 26

## 2022-08-15 RX ORDER — SODIUM CHLORIDE 9 MG/ML
1000 INJECTION, SOLUTION INTRAVENOUS
Refills: 0 | Status: DISCONTINUED | OUTPATIENT
Start: 2022-08-15 | End: 2022-08-29

## 2022-08-15 NOTE — ASU DISCHARGE PLAN (ADULT/PEDIATRIC) - CARE PROVIDER_API CALL
Paulette Triplett)  Obstetrics and Gynecology  [address] [place] [address]  Phone: [phone]  Fax: [phone]  Follow Up Time: 2 weeks

## 2022-08-15 NOTE — ASU DISCHARGE PLAN (ADULT/PEDIATRIC) - ASU DC SPECIAL INSTRUCTIONSFT
Return to your regular way of eating.  Resume normal activity as tolerated, but no heavy lifting or strenuous activity for 2 weeks.  No driving for next 2 weeks and/or while on narcotic pain medication.  Complete vaginal rest, no tampons, no douching, no tub bathing, no sexual activities for 2 weeks unless otherwise instructed by your doctor.  Call your doctor with any signs and symptoms of infection such as fever, chills, nausea or vomiting.  Call your doctor with redness or swelling at the incision site, fluid leakage or wound separation.  Call your doctor if you're unable to tolerate food or have difficulty urinating.  Call your doctor if you have pain that is not relieved by your prescribed medications.  Notify your doctor with any other concerns.  Follow up with Dr. Triplett in 2 weeks

## 2022-08-15 NOTE — ASU DISCHARGE PLAN (ADULT/PEDIATRIC) - NURSING INSTRUCTIONS
Last dose of TYLENOL for pain management was at 11:05 AM. Next dose of TYLENOL may be taken at or after 5:05 PM if needed. DO NOT take any additional products containing TYLENOL or ACETAMINOPHEN, such as VICODIN, PERCOCET, NORCO, EXCEDRIN, and any over-the-counter cold medications. DO NOT CONSUME MORE THAN 1369-2537 MG OF TYLENOL (acetaminophen) in a 24-hour period.  Next dose of NSAIDS (ibuprofen, motrin, advil, naproxen, aleve, or aspirin) may be taken at or after 5:10 PM if needed.

## 2022-08-15 NOTE — BRIEF OPERATIVE NOTE - OPERATION/FINDINGS
EUA revealed small retroverted uterus. No adnexal fullness. Diagnostic hysteroscopy revealed intrauterine cavity with injected anterior tissue. Bilateral ostia visualized and wnl.

## 2022-08-18 LAB — SURGICAL PATHOLOGY STUDY: SIGNIFICANT CHANGE UP

## 2022-09-07 PROBLEM — G47.30 SLEEP APNEA, UNSPECIFIED: Chronic | Status: ACTIVE | Noted: 2022-08-10

## 2022-09-07 PROBLEM — G62.9 POLYNEUROPATHY, UNSPECIFIED: Chronic | Status: ACTIVE | Noted: 2022-08-10

## 2022-09-07 PROBLEM — I89.0 LYMPHEDEMA, NOT ELSEWHERE CLASSIFIED: Chronic | Status: ACTIVE | Noted: 2020-12-03

## 2022-09-07 PROBLEM — L59.8 OTHER SPECIFIED DISORDERS OF THE SKIN AND SUBCUTANEOUS TISSUE RELATED TO RADIATION: Chronic | Status: ACTIVE | Noted: 2022-08-10

## 2022-09-07 PROBLEM — C50.919 MALIGNANT NEOPLASM OF UNSPECIFIED SITE OF UNSPECIFIED FEMALE BREAST: Chronic | Status: ACTIVE | Noted: 2020-12-03

## 2022-09-07 PROBLEM — N95.0 POSTMENOPAUSAL BLEEDING: Chronic | Status: ACTIVE | Noted: 2022-08-10

## 2022-09-07 PROBLEM — F41.9 ANXIETY DISORDER, UNSPECIFIED: Chronic | Status: ACTIVE | Noted: 2022-08-10

## 2022-09-07 PROBLEM — E78.5 HYPERLIPIDEMIA, UNSPECIFIED: Chronic | Status: ACTIVE | Noted: 2022-08-10

## 2022-10-06 NOTE — DISCHARGE NOTE PROVIDER - NSDCHHCONTACT_GEN_ALL_CORE_FT
As certified below, I, or a nurse practitioner or physician assistant working with me, had a face-to-face encounter that meets the physician face-to-face encounter requirements.
Home

## 2022-10-20 ENCOUNTER — APPOINTMENT (OUTPATIENT)
Dept: OBGYN | Facility: CLINIC | Age: 62
End: 2022-10-20

## 2022-10-20 VITALS
HEART RATE: 63 BPM | SYSTOLIC BLOOD PRESSURE: 150 MMHG | WEIGHT: 200 LBS | HEIGHT: 65 IN | BODY MASS INDEX: 33.32 KG/M2 | DIASTOLIC BLOOD PRESSURE: 89 MMHG

## 2022-10-20 DIAGNOSIS — G62.9 POLYNEUROPATHY, UNSPECIFIED: ICD-10-CM

## 2022-10-20 PROCEDURE — 99204 OFFICE O/P NEW MOD 45 MIN: CPT

## 2022-10-20 RX ORDER — METOPROLOL SUCCINATE 25 MG/1
25 TABLET, EXTENDED RELEASE ORAL
Qty: 30 | Refills: 0 | Status: ACTIVE | COMMUNITY
Start: 2022-09-02

## 2022-10-20 NOTE — DISCUSSION/SUMMARY
[FreeTextEntry1] : 60 yo P4 w/PMB and h/o breast ca.\par Pt s/p EMbx w/normal EM tissue. She and her  are very concerned about the possibility of future malignancy. They would also like to decrease the chance of recurrence of breast ca.\par We spoke about options for management, they would like to proceed w/definitive surgical intervention.\par Past hx of breast ca, c-s, htn, lymphedema, appendectomy.\par Multiple questions answered.\par \par Plan\par Schedule vNOTES [facility] (pt aware of possible conversion to lsc)\par Med clearance\par TEB for preop chat\par \par Letter to Dr. Paulette Triplett

## 2022-10-20 NOTE — PHYSICAL EXAM
[Normal] : normal [FreeTextEntry7] : Soft, NT, c/w BMI [FreeTextEntry4] : Adequate introitus [FreeTextEntry5] : Mobile. [FreeTextEntry6] : Unable to palpate fundus secondary to habitus.

## 2023-01-12 ENCOUNTER — APPOINTMENT (OUTPATIENT)
Dept: OBGYN | Facility: CLINIC | Age: 63
End: 2023-01-12
Payer: COMMERCIAL

## 2023-01-12 PROCEDURE — 99212 OFFICE O/P EST SF 10 MIN: CPT | Mod: 95

## 2023-01-12 NOTE — HISTORY OF PRESENT ILLNESS
[FreeTextEntry1] : 61 yo P4 here for preop chat prior to vNOTES [facility] BSO for PMB and h/o breast ca.\par \par Pt will have PST tomorrow and med clearance upcoming.

## 2023-01-12 NOTE — DISCUSSION/SUMMARY
[FreeTextEntry1] : 63 yo P4 here for preop chat prior to vNOTES [facility] BSO for PMB and h/o breast ca.\par Case reviewed.\par H/o  x 1\par Questions answered.\par \par Plan \par Pt to keep appointments for PST and med clearance\par Keep appointment for vNOTES [facility]\par

## 2023-01-13 ENCOUNTER — OUTPATIENT (OUTPATIENT)
Dept: OUTPATIENT SERVICES | Facility: HOSPITAL | Age: 63
LOS: 1 days | End: 2023-01-13
Payer: COMMERCIAL

## 2023-01-13 VITALS
WEIGHT: 199.96 LBS | HEART RATE: 63 BPM | OXYGEN SATURATION: 96 % | TEMPERATURE: 98 F | SYSTOLIC BLOOD PRESSURE: 140 MMHG | HEIGHT: 63.5 IN | RESPIRATION RATE: 18 BRPM | DIASTOLIC BLOOD PRESSURE: 90 MMHG

## 2023-01-13 DIAGNOSIS — Z90.49 ACQUIRED ABSENCE OF OTHER SPECIFIED PARTS OF DIGESTIVE TRACT: Chronic | ICD-10-CM

## 2023-01-13 DIAGNOSIS — Z96.651 PRESENCE OF RIGHT ARTIFICIAL KNEE JOINT: Chronic | ICD-10-CM

## 2023-01-13 DIAGNOSIS — Z98.891 HISTORY OF UTERINE SCAR FROM PREVIOUS SURGERY: Chronic | ICD-10-CM

## 2023-01-13 DIAGNOSIS — Z92.21 PERSONAL HISTORY OF ANTINEOPLASTIC CHEMOTHERAPY: Chronic | ICD-10-CM

## 2023-01-13 DIAGNOSIS — Z96.652 PRESENCE OF LEFT ARTIFICIAL KNEE JOINT: Chronic | ICD-10-CM

## 2023-01-13 DIAGNOSIS — Z90.13 ACQUIRED ABSENCE OF BILATERAL BREASTS AND NIPPLES: Chronic | ICD-10-CM

## 2023-01-13 DIAGNOSIS — N95.0 POSTMENOPAUSAL BLEEDING: ICD-10-CM

## 2023-01-13 DIAGNOSIS — Z98.890 OTHER SPECIFIED POSTPROCEDURAL STATES: Chronic | ICD-10-CM

## 2023-01-13 DIAGNOSIS — G47.33 OBSTRUCTIVE SLEEP APNEA (ADULT) (PEDIATRIC): ICD-10-CM

## 2023-01-13 DIAGNOSIS — Z90.89 ACQUIRED ABSENCE OF OTHER ORGANS: Chronic | ICD-10-CM

## 2023-01-13 LAB
A1C WITH ESTIMATED AVERAGE GLUCOSE RESULT: 6.2 % — HIGH (ref 4–5.6)
ALBUMIN SERPL ELPH-MCNC: 4.1 G/DL — SIGNIFICANT CHANGE UP (ref 3.3–5)
ALP SERPL-CCNC: 48 U/L — SIGNIFICANT CHANGE UP (ref 40–120)
ALT FLD-CCNC: 16 U/L — SIGNIFICANT CHANGE UP (ref 4–33)
ANION GAP SERPL CALC-SCNC: 12 MMOL/L — SIGNIFICANT CHANGE UP (ref 7–14)
AST SERPL-CCNC: 19 U/L — SIGNIFICANT CHANGE UP (ref 4–32)
BILIRUB SERPL-MCNC: 0.2 MG/DL — SIGNIFICANT CHANGE UP (ref 0.2–1.2)
BLD GP AB SCN SERPL QL: NEGATIVE — SIGNIFICANT CHANGE UP
BUN SERPL-MCNC: 15 MG/DL — SIGNIFICANT CHANGE UP (ref 7–23)
CALCIUM SERPL-MCNC: 8.7 MG/DL — SIGNIFICANT CHANGE UP (ref 8.4–10.5)
CHLORIDE SERPL-SCNC: 106 MMOL/L — SIGNIFICANT CHANGE UP (ref 98–107)
CO2 SERPL-SCNC: 23 MMOL/L — SIGNIFICANT CHANGE UP (ref 22–31)
CREAT SERPL-MCNC: 0.64 MG/DL — SIGNIFICANT CHANGE UP (ref 0.5–1.3)
EGFR: 99 ML/MIN/1.73M2 — SIGNIFICANT CHANGE UP
ESTIMATED AVERAGE GLUCOSE: 131 — SIGNIFICANT CHANGE UP
GLUCOSE SERPL-MCNC: 110 MG/DL — HIGH (ref 70–99)
HCT VFR BLD CALC: 45.1 % — HIGH (ref 34.5–45)
HGB BLD-MCNC: 14 G/DL — SIGNIFICANT CHANGE UP (ref 11.5–15.5)
MCHC RBC-ENTMCNC: 26.7 PG — LOW (ref 27–34)
MCHC RBC-ENTMCNC: 31 GM/DL — LOW (ref 32–36)
MCV RBC AUTO: 86.1 FL — SIGNIFICANT CHANGE UP (ref 80–100)
NRBC # BLD: 0 /100 WBCS — SIGNIFICANT CHANGE UP (ref 0–0)
NRBC # FLD: 0 K/UL — SIGNIFICANT CHANGE UP (ref 0–0)
PLATELET # BLD AUTO: 233 K/UL — SIGNIFICANT CHANGE UP (ref 150–400)
POTASSIUM SERPL-MCNC: 4.4 MMOL/L — SIGNIFICANT CHANGE UP (ref 3.5–5.3)
POTASSIUM SERPL-SCNC: 4.4 MMOL/L — SIGNIFICANT CHANGE UP (ref 3.5–5.3)
PROT SERPL-MCNC: 7 G/DL — SIGNIFICANT CHANGE UP (ref 6–8.3)
RBC # BLD: 5.24 M/UL — HIGH (ref 3.8–5.2)
RBC # FLD: 14.6 % — HIGH (ref 10.3–14.5)
RH IG SCN BLD-IMP: POSITIVE — SIGNIFICANT CHANGE UP
SODIUM SERPL-SCNC: 141 MMOL/L — SIGNIFICANT CHANGE UP (ref 135–145)
WBC # BLD: 6.17 K/UL — SIGNIFICANT CHANGE UP (ref 3.8–10.5)
WBC # FLD AUTO: 6.17 K/UL — SIGNIFICANT CHANGE UP (ref 3.8–10.5)

## 2023-01-13 PROCEDURE — 93010 ELECTROCARDIOGRAM REPORT: CPT

## 2023-01-13 RX ORDER — CALCIUM CARBONATE 500(1250)
1 TABLET ORAL
Qty: 0 | Refills: 0 | DISCHARGE

## 2023-01-13 RX ORDER — SODIUM CHLORIDE 9 MG/ML
3 INJECTION INTRAMUSCULAR; INTRAVENOUS; SUBCUTANEOUS EVERY 8 HOURS
Refills: 0 | Status: DISCONTINUED | OUTPATIENT
Start: 2023-01-27 | End: 2023-02-10

## 2023-01-13 RX ORDER — SODIUM CHLORIDE 9 MG/ML
1000 INJECTION, SOLUTION INTRAVENOUS
Refills: 0 | Status: DISCONTINUED | OUTPATIENT
Start: 2023-01-27 | End: 2023-02-10

## 2023-01-13 RX ORDER — ASCORBIC ACID 60 MG
1 TABLET,CHEWABLE ORAL
Qty: 0 | Refills: 0 | DISCHARGE

## 2023-01-13 NOTE — H&P PST ADULT - RESPIRATORY
clear to auscultation bilaterally/no wheezes/no rales/no rhonchi/no respiratory distress/airway patent/breath sounds equal/good air movement/respirations non-labored/respiratory distress clear to auscultation bilaterally/no wheezes/no rales/no rhonchi/no respiratory distress/airway patent/breath sounds equal/good air movement/respirations non-labored

## 2023-01-13 NOTE — H&P PST ADULT - NEGATIVE ENMT SYMPTOMS
no hearing difficulty/no vertigo/no sinus symptoms/no nasal congestion/no post-nasal discharge/no nose bleeds/no recurrent cold sores/no abnormal taste sensation/no throat pain/no dysphagia

## 2023-01-13 NOTE — H&P PST ADULT - NSICDXPASTMEDICALHX_GEN_ALL_CORE_FT
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

## 2023-01-13 NOTE — H&P PST ADULT - LYMPHATIC
No lymphadedenopathy Bilobed Transposition Flap Text: The defect edges were debeveled with a #15 scalpel blade.  Given the location of the defect and the proximity to free margins a bilobed transposition flap was deemed most appropriate.  Using a sterile surgical marker, an appropriate bilobe flap drawn around the defect.    The area thus outlined was incised deep to adipose tissue with a #15 scalpel blade.  The skin margins were undermined to an appropriate distance in all directions utilizing iris scissors.

## 2023-01-13 NOTE — H&P PST ADULT - NSICDXPASTSURGICALHX_GEN_ALL_CORE_FT
PAST SURGICAL HISTORY:  H/O bilateral mastectomy right , left 2008    H/O rhinoplasty 1981    H/O total knee replacement, right 3/2021    H/O:      History of appendectomy     History of left knee replacement 2020    History of tonsillectomy     Status post chemotherapy infusaport removed 2021     PAST SURGICAL HISTORY:  H/O bilateral mastectomy right , left 2008    H/O rhinoplasty 1981    H/O total knee replacement, right 3/2021    H/O:      History of appendectomy     History of left knee replacement 2020    History of tonsillectomy     History of total right knee replacement     Status post chemotherapy infusaport removed 2021

## 2023-01-13 NOTE — H&P PST ADULT - HISTORY OF PRESENT ILLNESS
61 y/o F with H/o HTN, Depression, HLD, Obesity  1 year h/o intermittent vaginal spotting, microscopic hematuria. S/P D&C 2022 61 y/o F with H/o HTN, Depression, HLD, Obesity, SUBHA presents to PST for pre ope valuation with 1 year h/o intermittent vaginal spotting, microscopic hematuria. S/P D&C 2022. Pre op diagnosis post menopausal bleeding. now schedule for total laparoscopic hysterectomy 61 y/o F with H/o HTN, Depression, HLD, Obesity, SUBHA presents to PST for pre ope valuation with 1 year h/o intermittent vaginal spotting, microscopic hematuria. S/P D&C 2022. Pre op diagnosis post menopausal bleeding. Now schedule for total laparoscopic hysterectomy

## 2023-01-13 NOTE — H&P PST ADULT - NEUROLOGICAL
details… sensation intact/responds to pain/responds to verbal commands cranial nerves II-XII intact/sensation intact/responds to pain/responds to verbal commands

## 2023-01-13 NOTE — H&P PST ADULT - NEGATIVE GASTROINTESTINAL SYMPTOMS
no nausea/no vomiting/no constipation/no change in bowel habits/no abdominal pain/no melena/no jaundice/no hiccoughs

## 2023-01-13 NOTE — H&P PST ADULT - PROBLEM SELECTOR PLAN 1
Schedule for total laparoscopic hysterectomy on 01/27/2023. Pre op instructions, famotidine, chlorhexidine gluconate soap given and explained. Pt verbalized understanding.  Pt reported + Covid test on 01/02/2023

## 2023-01-26 ENCOUNTER — TRANSCRIPTION ENCOUNTER (OUTPATIENT)
Age: 63
End: 2023-01-26

## 2023-01-26 NOTE — ASU PATIENT PROFILE, ADULT - NSICDXPASTSURGICALHX_GEN_ALL_CORE_FT
PAST SURGICAL HISTORY:  H/O bilateral mastectomy right , left 2008    H/O rhinoplasty 1981    H/O total knee replacement, right 3/2021    H/O:      History of appendectomy     History of left knee replacement 2020    History of tonsillectomy     History of total right knee replacement     Status post chemotherapy infusaport removed 2021

## 2023-01-26 NOTE — ASU PATIENT PROFILE, ADULT - FALL HARM RISK - HARM RISK INTERVENTIONS

## 2023-01-26 NOTE — ASU PATIENT PROFILE, ADULT - NSICDXPASTMEDICALHX_GEN_ALL_CORE_FT
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

## 2023-01-27 ENCOUNTER — APPOINTMENT (OUTPATIENT)
Dept: OBGYN | Facility: HOSPITAL | Age: 63
End: 2023-01-27

## 2023-01-27 ENCOUNTER — OUTPATIENT (OUTPATIENT)
Dept: OUTPATIENT SERVICES | Facility: HOSPITAL | Age: 63
LOS: 1 days | Discharge: HOME | End: 2023-01-27
Payer: COMMERCIAL

## 2023-01-27 ENCOUNTER — TRANSCRIPTION ENCOUNTER (OUTPATIENT)
Age: 63
End: 2023-01-27

## 2023-01-27 ENCOUNTER — RESULT REVIEW (OUTPATIENT)
Age: 63
End: 2023-01-27

## 2023-01-27 VITALS
HEIGHT: 63.5 IN | SYSTOLIC BLOOD PRESSURE: 133 MMHG | WEIGHT: 199.96 LBS | DIASTOLIC BLOOD PRESSURE: 79 MMHG | TEMPERATURE: 98 F | OXYGEN SATURATION: 95 % | RESPIRATION RATE: 16 BRPM | HEART RATE: 61 BPM

## 2023-01-27 VITALS
DIASTOLIC BLOOD PRESSURE: 78 MMHG | SYSTOLIC BLOOD PRESSURE: 142 MMHG | RESPIRATION RATE: 15 BRPM | HEART RATE: 70 BPM | TEMPERATURE: 98 F | OXYGEN SATURATION: 100 %

## 2023-01-27 DIAGNOSIS — Z96.652 PRESENCE OF LEFT ARTIFICIAL KNEE JOINT: Chronic | ICD-10-CM

## 2023-01-27 DIAGNOSIS — Z92.21 PERSONAL HISTORY OF ANTINEOPLASTIC CHEMOTHERAPY: Chronic | ICD-10-CM

## 2023-01-27 DIAGNOSIS — N95.0 POSTMENOPAUSAL BLEEDING: ICD-10-CM

## 2023-01-27 DIAGNOSIS — Z98.891 HISTORY OF UTERINE SCAR FROM PREVIOUS SURGERY: Chronic | ICD-10-CM

## 2023-01-27 DIAGNOSIS — Z96.651 PRESENCE OF RIGHT ARTIFICIAL KNEE JOINT: Chronic | ICD-10-CM

## 2023-01-27 DIAGNOSIS — N93.9 ABNORMAL UTERINE AND VAGINAL BLEEDING, UNSPECIFIED: ICD-10-CM

## 2023-01-27 DIAGNOSIS — Z90.49 ACQUIRED ABSENCE OF OTHER SPECIFIED PARTS OF DIGESTIVE TRACT: Chronic | ICD-10-CM

## 2023-01-27 DIAGNOSIS — Z90.13 ACQUIRED ABSENCE OF BILATERAL BREASTS AND NIPPLES: Chronic | ICD-10-CM

## 2023-01-27 DIAGNOSIS — Z90.89 ACQUIRED ABSENCE OF OTHER ORGANS: Chronic | ICD-10-CM

## 2023-01-27 DIAGNOSIS — Z98.890 OTHER SPECIFIED POSTPROCEDURAL STATES: Chronic | ICD-10-CM

## 2023-01-27 PROCEDURE — 88307 TISSUE EXAM BY PATHOLOGIST: CPT | Mod: 26

## 2023-01-27 PROCEDURE — 58552 LAPARO-VAG HYST INCL T/O: CPT

## 2023-01-27 RX ORDER — RAMIPRIL 5 MG
1 CAPSULE ORAL
Qty: 0 | Refills: 0 | DISCHARGE

## 2023-01-27 RX ORDER — ROSUVASTATIN CALCIUM 5 MG/1
1 TABLET ORAL
Qty: 0 | Refills: 0 | DISCHARGE

## 2023-01-27 RX ORDER — METOPROLOL TARTRATE 50 MG
1 TABLET ORAL
Qty: 0 | Refills: 0 | DISCHARGE

## 2023-01-27 RX ORDER — FENTANYL CITRATE 50 UG/ML
50 INJECTION INTRAVENOUS
Refills: 0 | Status: DISCONTINUED | OUTPATIENT
Start: 2023-01-27 | End: 2023-01-27

## 2023-01-27 RX ORDER — ONDANSETRON 8 MG/1
4 TABLET, FILM COATED ORAL
Refills: 0 | Status: DISCONTINUED | OUTPATIENT
Start: 2023-01-27 | End: 2023-02-10

## 2023-01-27 RX ORDER — VITAMIN E 100 UNIT
1 CAPSULE ORAL
Qty: 0 | Refills: 0 | DISCHARGE

## 2023-01-27 RX ORDER — CITALOPRAM 10 MG/1
1 TABLET, FILM COATED ORAL
Qty: 0 | Refills: 0 | DISCHARGE

## 2023-01-27 RX ORDER — CHLORHEXIDINE GLUCONATE 213 G/1000ML
1 SOLUTION TOPICAL ONCE
Refills: 0 | Status: COMPLETED | OUTPATIENT
Start: 2023-01-27 | End: 2023-01-27

## 2023-01-27 RX ORDER — CHOLECALCIFEROL (VITAMIN D3) 125 MCG
1 CAPSULE ORAL
Qty: 0 | Refills: 0 | DISCHARGE

## 2023-01-27 RX ORDER — OXYCODONE HYDROCHLORIDE 5 MG/1
1 TABLET ORAL
Qty: 5 | Refills: 0
Start: 2023-01-27

## 2023-01-27 RX ORDER — CETIRIZINE HYDROCHLORIDE 10 MG/1
1 TABLET ORAL
Qty: 0 | Refills: 0 | DISCHARGE

## 2023-01-27 RX ADMIN — SODIUM CHLORIDE 3 MILLILITER(S): 9 INJECTION INTRAMUSCULAR; INTRAVENOUS; SUBCUTANEOUS at 14:22

## 2023-01-27 RX ADMIN — CHLORHEXIDINE GLUCONATE 1 APPLICATION(S): 213 SOLUTION TOPICAL at 10:40

## 2023-01-27 NOTE — ASU DISCHARGE PLAN (ADULT/PEDIATRIC) - PROCEDURE
Laparoscopic Vaginal Natural Orifice Transluminal Endoscopic Hysterectomy, Bilateral Salpingo-oophorectomy, & Cystoscopy

## 2023-01-27 NOTE — CHART NOTE - NSCHARTNOTEFT_GEN_A_CORE
S: Patient seen and evaluated at bedside.  Pt awake and alert resting comfortably in the chair. Patient reports good pain controlled with analgesia. Pt denies N/V, SOB, CP, palpitations, fever/chills. Tolerating clears. Ambulating without difficulty. Voided x2.     O:   T(C): 36.7 (01-27-23 @ 16:15), Max: 36.7 (01-27-23 @ 15:45)  HR: 70 (01-27-23 @ 16:15) (59 - 74)  BP: 142/78 (01-27-23 @ 16:15) (134/89 - 146/82)  RR: 15 (01-27-23 @ 16:15) (12 - 15)  SpO2: 100% (01-27-23 @ 16:15) (94% - 100%)  Wt(kg): --  I&O's Summary    27 Jan 2023 07:01  -  27 Jan 2023 16:31  --------------------------------------------------------  IN: 120 mL / OUT: 500 mL / NET: -380 mL        Gen: Resting comfortably in the chair, NAD  Abd: soft, non tender, non distended.   No vaginal bleeding or discharge on pad.   Ext: SCD's in place and functional, non-tender b/l, no edema    Labs:      MEDICATIONS  (STANDING):  lactated ringers. 1000 milliLiter(s) (30 mL/Hr) IV Continuous <Continuous>  sodium chloride 0.9% lock flush 3 milliLiter(s) IV Push every 8 hours    MEDICATIONS  (PRN):  fentaNYL    Injectable 50 MICROGram(s) IV Push every 15 minutes PRN Severe Pain (7 - 10)  ondansetron Injectable 4 milliGRAM(s) IV Push every 15 minutes PRN Nausea and/or Vomiting        A/P: 62y Female s/p vNOTEs, TLH, BSO, and cysto     Neuro: PO Analgesia PRN    CV: Hemodynamically stable.  Monitor VS.   Pulm: Saturating well on room air.  Encourage OOB and incentive spirometer use.   GI: Advance to regular diet. Anti-emetics PRN.  : Voiding spontaneously.   FEN: PO fluid intake.   Heme: DVT ppx w/ SCD's while in bed. Early ambulation, initially with assistance then as tolerated.   ID: Afebrile  Endo: No active issues   Dispo: Discharge from PACU     Marisol Gallo, PGY-2

## 2023-01-27 NOTE — BRIEF OPERATIVE NOTE - OPERATION/FINDINGS
Exam under anesthesia:  - obese abdomen  - 6 week sized mobile uterus  - normal external genitalia  - no adnexal masses appreciated  - smooth rectovaginal septum    Intraoperative Findings:  - Laparoscopic (vNOTE)findings notable for grossly normal appearing uterus and bilateral ovaries and fallopian tubes, bilateral ureters distant from resection site with active vermiculation without injury or defect  - Cystoscopy findings notbale for grossly normal appearing bladder mucosa with any injury/defect/foreign objects, bilateral uretheral jets visualized with active expulsion of clear urine  - excellent hemostasis     Dictation# Exam under anesthesia:  - obese abdomen  - ~6 week sized mobile uterus (difficult to palpate 2/2 habitus)  - normal external genitalia  - no adnexal masses appreciated  - smooth rectovaginal septum    Intraoperative Findings:  - Laparoscopic (vNOTE) findings notable for grossly normal appearing uterus and bilateral ovaries and fallopian tubes, bilateral ureters distant from resection site with active vermiculation without injury or defect  - Cystoscopy findings notable for grossly normal appearing bladder mucosa with any injury/defect/foreign objects, bilateral ureteral jets visualized with active expulsion of clear urine  - excellent hemostasis

## 2023-01-27 NOTE — ASU DISCHARGE PLAN (ADULT/PEDIATRIC) - CARE PROVIDER_API CALL
Mk Arriaga  OBSTETRICS AND GYNECOLOGY  [address] [place] [address]  Phone: [phone]  Fax: [phone]  Established Patient  Follow Up Time: 2 weeks

## 2023-01-27 NOTE — ASU DISCHARGE PLAN (ADULT/PEDIATRIC) - MEDICATION INSTRUCTIONS
Take Tylenol & Motrin every 6 hours (alternate every 3 hours). Take Oxy every 6 hours as needed for severe pain.

## 2023-01-27 NOTE — ASU DISCHARGE PLAN (ADULT/PEDIATRIC) - CALL YOUR DOCTOR IF YOU HAVE ANY OF THE FOLLOWING:
Bleeding that does not stop/Pain not relieved by Medications/Fever greater than (need to indicate Fahrenheit or Celsius)/Wound/Surgical Site with redness, or foul smelling discharge or pus/Nausea and vomiting that does not stop/Unable to urinate/Inability to tolerate liquids or foods/Increased irritability or sluggishness Bleeding that does not stop/Swelling that gets worse/Pain not relieved by Medications/Fever greater than (need to indicate Fahrenheit or Celsius)/Wound/Surgical Site with redness, or foul smelling discharge or pus/Nausea and vomiting that does not stop/Unable to urinate/Inability to tolerate liquids or foods/Increased irritability or sluggishness

## 2023-01-27 NOTE — BRIEF OPERATIVE NOTE - NSICDXBRIEFPROCEDURE_GEN_ALL_CORE_FT
PROCEDURES:  Hysterectomy, endoscopic, vaginal transluminal natural orifice approach, with salpingo-oophorectomy 27-Jan-2023 14:00:43  Phyllis Guerra   PROCEDURES:  Hysterectomy, endoscopic, vaginal transluminal natural orifice approach, with salpingo-oophorectomy 27-Jan-2023 14:00:43  Phyllis Guerra  Cystoscopy 27-Jan-2023 14:26:47  Joyce Dawson

## 2023-01-27 NOTE — ASU DISCHARGE PLAN (ADULT/PEDIATRIC) - ASU DC SPECIAL INSTRUCTIONSFT
Regular diet as tolerated, regular activity as tolerated, no heavy lifting for first two weeks.  Nothing per vagina: no intercourse, tampons or douching.  Call your provider if you experience fevers, chills, worsening abdominal pain, inability to urinate or worsening vaginal bleeding.  Follow up with your provider in 2 weeks. Regular diet as tolerated, regular activity as tolerated, no heavy lifting for first two weeks.  Nothing per vagina: no intercourse, tampons or douching.  Call your provider if you experience fevers, chills, worsening abdominal pain, inability to urinate or worsening vaginal bleeding.  Follow up with your provider in 2 weeks.    You were given 1000mg IV Tylenol for pain management.  Please DO NOT take Tylenol, Vicodin or Percocet for the next 6 hours (until 7:45 pm ).  DO NOT EXCEED 3000MG OF TYLENOL OVER 24 HOURS.

## 2023-01-27 NOTE — ASU PREOP CHECKLIST - IDENTIFICATION BAND VERIFIED
DEPARTMENT OF OTOLARYNGOLOGY   HISTORY AND PHYSICAL      PATIENT: Mirella Nance : 2020 (23 m.o.)   DATE: February 3, 2020  REFERRED BY: No referring provider defined for this encounter. HISTORY OBTAINED FROM:  patient    CHIEF COMPLAINT:  had concerns including Sinus Problem (ear infections and sinus infection , cough constant , 2 months ago thought something was up nose went to er but nothing , with a smell from nose . also fell and landed on face possible nose fx at that time). HISTORY OF PRESENT ILLNESS:                                                                                        Mirella Nance is a(n) 21 m.o. male without a significant past medical history presents to the office today as a new patient for evaluation of his ear infections and sinus infections for the past year. He has had otitis media 7-8 times within the past 12 months that required abx. The last infection was last week. Denies drainage from ears. Denies any hearing changes. Passed  hearing. Patient at  5 days a week. Denies tobacco exposure. Father also complains patient has greenish discharge from nose (both nostril) 2x in the past year. Last nasal discharge was 2 months. No past medical history on file. No past surgical history on file. Current Outpatient Medications:     Pediatric Multiple Vit-C-FA (MULTIVITAMIN CHILDRENS) CHEW, Take 1 tablet by mouth daily, Disp: , Rfl:     acetaminophen (TYLENOL) 160 MG/5ML suspension, Take 192 mg by mouth every 6 hours as needed, Disp: , Rfl:     cetirizine HCl (ZYRTEC) 5 MG/5ML SOLN, Take 2.5 mg by mouth daily, Disp: , Rfl:     Spacer/Aero-Holding Chambers (OPTICHAMBER ADVANTAGE-SM MASK) MISC, Use with inhaled medication as instructed., Disp: , Rfl:     Emollient (CETAPHIL) cream, Apply topically as needed, Disp: , Rfl:     No Known Allergies    No family history on file.     Social History     Socioeconomic History    Marital status: Single depression and hallucinations     PHYSICAL EXAM:                                                                                                                Wt 30 lb 8 oz (13.8 kg)   Physical Exam  Constitutional: Appears well-developed and well-nourished. Eyes: Lids and lashes normal, pupils equal, extra ocular muscles intact, sclera clear   ENT: Normocephalic, without obvious abnormality, atraumatic, sinuses nontender on palpation, external ears without lesions, Left TM erythematous without any overt effusion, R TM WNL, bilateral EAC WNL, oral pharynx with moist mucus membranes, 3+ tonsils without erythema or exudates, gums normal and good dentition. Neck:  Supple, symmetrical,trachea midline, no adenopathy, thyroid symmetric, not enlarged and no tenderness, skin normal   Respiratory: No increased work of breathing. No stridor or wheezes   Cardiovascular: Regular rate   Skin: Warm and dry   Neurologic:  Alert and oriented     ASSESSMENTAND PLAN:                                                                                                  Diagnosis Orders   1. Chronic otitis media, unspecified otitis media type         bilateral myringotomy with tube placement  The procedure risks and benefits were discussed with the patient and family. Pt and family understood and decided to proceed with the surgery. Main Surgical risks include:  --Hole in the Eardrum  --Cholesteatoma  --Massive bleeding from injuring a congenital dehiscence of the jugular bulb  --Hearing Loss and Vertigo  Pt and family understood and decided to proceed with the surgery. Follow up in 1 week post op or sooner if symptoms acutely exacerbate    Patient was seen and examined with attending physician, who agrees with the assessment andplans.     Osmani Mcgregor DO  Resident Physician  [facility])  Otolaryngology Residency  2/3/2020  3:18 PM          Homar Guevara  2020      I have discussed the case, including pertinent history and exam findings with the resident. I have seen and examined the patient and the key elements of the encounter have been performed by me. I agree with the assessment, plan and orders as documented by the resident. Patient here for follow up of medical problems. Remainder of medical problems as per resident note.       [id number], DO  2/9/20 done

## 2023-01-30 LAB — GLUCOSE BLDC GLUCOMTR-MCNC: 96 MG/DL — SIGNIFICANT CHANGE UP (ref 70–99)

## 2023-01-31 NOTE — PHYSICAL THERAPY INITIAL EVALUATION ADULT - STANDING BALANCE: STATIC
c liss/good balance Methotrexate Pregnancy And Lactation Text: This medication is Pregnancy Category X and is known to cause fetal harm. This medication is excreted in breast milk.

## 2023-02-02 LAB — SURGICAL PATHOLOGY STUDY: SIGNIFICANT CHANGE UP

## 2023-03-07 ENCOUNTER — APPOINTMENT (OUTPATIENT)
Dept: OBGYN | Facility: CLINIC | Age: 63
End: 2023-03-07
Payer: COMMERCIAL

## 2023-03-07 VITALS
SYSTOLIC BLOOD PRESSURE: 163 MMHG | BODY MASS INDEX: 33.49 KG/M2 | WEIGHT: 201 LBS | DIASTOLIC BLOOD PRESSURE: 93 MMHG | HEIGHT: 65 IN | HEART RATE: 69 BPM

## 2023-03-07 DIAGNOSIS — N95.0 POSTMENOPAUSAL BLEEDING: ICD-10-CM

## 2023-03-07 PROCEDURE — 99024 POSTOP FOLLOW-UP VISIT: CPT

## 2023-03-07 RX ORDER — AMOXICILLIN 250 MG/5ML
250 POWDER, FOR SUSPENSION ORAL 3 TIMES DAILY
Qty: 1 | Refills: 0 | Status: COMPLETED | COMMUNITY
Start: 2018-11-06 | End: 2023-03-07

## 2023-03-07 RX ORDER — AMOXICILLIN 250 MG/5ML
250 POWDER, FOR SUSPENSION ORAL 3 TIMES DAILY
Qty: 1 | Refills: 0 | Status: COMPLETED | COMMUNITY
Start: 2018-12-03 | End: 2023-03-07

## 2023-03-07 RX ORDER — CEPHALEXIN 250 MG/5ML
250 FOR SUSPENSION ORAL 4 TIMES DAILY
Qty: 1 | Refills: 0 | Status: COMPLETED | COMMUNITY
Start: 2018-11-13 | End: 2023-03-07

## 2023-03-07 RX ORDER — MELOXICAM 15 MG/1
15 TABLET ORAL
Qty: 30 | Refills: 2 | Status: COMPLETED | COMMUNITY
Start: 2021-06-10 | End: 2023-03-07

## 2023-03-07 RX ORDER — AMOXICILLIN 250 MG/5ML
250 POWDER, FOR SUSPENSION ORAL 3 TIMES DAILY
Qty: 1 | Refills: 0 | Status: COMPLETED | COMMUNITY
Start: 2018-11-01 | End: 2023-03-07

## 2023-03-07 RX ORDER — TRAMADOL HYDROCHLORIDE 50 MG/1
50 TABLET, COATED ORAL
Qty: 40 | Refills: 0 | Status: COMPLETED | COMMUNITY
Start: 2020-12-28 | End: 2023-03-07

## 2023-03-07 RX ORDER — OXYCODONE 5 MG/1
5 TABLET ORAL
Qty: 40 | Refills: 0 | Status: COMPLETED | COMMUNITY
Start: 2020-12-28 | End: 2023-03-07

## 2023-03-07 NOTE — HISTORY OF PRESENT ILLNESS
[FreeTextEntry1] : 63 yo P4 here for PO check after [facility] BSO 2/10/23 for PMB and h/o breast ca. \par No major complaints

## 2023-03-07 NOTE — DISCUSSION/SUMMARY
[FreeTextEntry1] : 61 yo P4 here for PO check after [facility] BSO 2/10/23 for PMB and h/o breast ca. \par Pt recovering well.\par Path benign.\par Questions answered.\par \par Plan\par Pt to f/u w/Dr. Paulette Triplett for routine care\par Will send letter.

## 2023-03-24 ENCOUNTER — NON-APPOINTMENT (OUTPATIENT)
Age: 63
End: 2023-03-24

## 2023-09-21 NOTE — H&P PST ADULT - NSICDXPASTSURGICALHX_GEN_ALL_CORE_FT
RESOLVED. No EKG changes. s/p lokelma TID x2 d PAST SURGICAL HISTORY:  H/O bilateral mastectomy     H/O rhinoplasty     H/O total knee replacement, right 3/2021    H/O:      History of appendectomy     History of left knee replacement 2020    History of tonsillectomy

## 2023-11-04 NOTE — OCCUPATIONAL THERAPY INITIAL EVALUATION ADULT - IMPAIRED TRANSFERS: TOILET, REHAB EVAL
Admission Reconciliation is Completed  Discharge Reconciliation is Not Complete
impaired balance/pain/decreased ROM/decreased strength

## 2024-07-05 NOTE — PHYSICAL THERAPY INITIAL EVALUATION ADULT - OCCUPATION
Statement Selected  [employer].  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

## 2024-10-02 NOTE — ASU PREOP CHECKLIST - SELECT TESTS ORDERED
October 2, 2024     Patient: Chencho Wilhelm   YOB: 2015   Date of Visit: 10/2/2024       To Whom it May Concern:    Chencho Wilhelm was seen in my clinic on 10/2/2024 at 11:45 am.     Please excuse Chencho for his absence from school on the date listed above to be able to make his appointment.  Patient is advised not to go to school this week.    Sincerely,         Ari Conn MD    Medical information is confidential and cannot be disclosed without the written consent of the patient or his representative.       BMP/CBC/Results in MD note

## 2025-05-07 ENCOUNTER — NON-APPOINTMENT (OUTPATIENT)
Age: 65
End: 2025-05-07

## 2025-05-28 NOTE — ASU PATIENT PROFILE, ADULT - PRO MENTAL HEALTH SX RECENT
Outbound call to patient to confirm an upcoming appointment with PA, Charles Fletcher. [facility] interpretation was used, #[id number] Barbara. VM was left for patient to call office or go to my chart to confirm appt.  
none

## 2025-07-29 NOTE — H&P PST ADULT - NSCAFFEAMTFREQ_GEN_ALL_CORE_SD
[employer] request for prior auth faxed into insurance [phone]. Will follow for response.  Electronically signed by Gaby Bradshaw on 7/29/2025 at 5:08 PM     1-2 cups/cans per day

## (undated) DEVICE — VENODYNE/SCD SLEEVE CALF MEDIUM

## (undated) DEVICE — POSITIONER STRAP ARMBOARD VELCRO TS-30

## (undated) DEVICE — TUBING INSUFFLATION LAP FILTER 10FT

## (undated) DEVICE — GOWN LG

## (undated) DEVICE — TUBING IRR SET FOR CYSTOSCOPY 77"

## (undated) DEVICE — DRAPE 3/4 SHEET 52X76"

## (undated) DEVICE — PREP BETADINE SPONGE STICKS

## (undated) DEVICE — GLV 6.5 PROTEXIS (CREAM) MICRO

## (undated) DEVICE — TUBING HYDRO-SURG PLUS IRRIGATOR W SMOKEVAC & PROBE

## (undated) DEVICE — D HELP - CLEARVIEW CLEARIFY SYSTEM

## (undated) DEVICE — GLV 6 PROTEXIS (CREAM) MICRO

## (undated) DEVICE — SUT VICRYL 0 27" UR-6

## (undated) DEVICE — GLV 8 PROTEXIS (BLUE)

## (undated) DEVICE — DRSG STERISTRIPS 0.5 X 4"

## (undated) DEVICE — SUT PDS II 2-0 27" CT-1

## (undated) DEVICE — SOL IRR POUR NS 0.9% 500ML

## (undated) DEVICE — GLV 7 PROTEXIS (CREAM) MICRO

## (undated) DEVICE — PREP CHLOROHEXIDINE 4% 118CC KIT

## (undated) DEVICE — PACK D&C

## (undated) DEVICE — SOL IRR POUR NS 0.9% 1000ML

## (undated) DEVICE — GELPOINT V-PATH TRANSVAGINAL ACCESS 9.5CM

## (undated) DEVICE — PACK PERI GYN

## (undated) DEVICE — TROCAR COVIDIEN VERSAONE FIXATION CANNULA 5MM

## (undated) DEVICE — TIP METZENBAUM SCISSOR MONOPOLAR ENDOCUT (ORANGE)

## (undated) DEVICE — DRSG TELFA 3 X 8

## (undated) DEVICE — TUBING SUCTION NONCONDUCTIVE 6MM X 12FT

## (undated) DEVICE — LABELS BLANK W PEN

## (undated) DEVICE — SOL IRR POUR H2O 500ML

## (undated) DEVICE — POSITIONER PINK PAD PIGAZZI SYSTEM

## (undated) DEVICE — DRSG TEGADERM 2.5X3"

## (undated) DEVICE — LIGASURE MARYLAND 37CM

## (undated) DEVICE — PRESSURE INFUSOR BAG 1000ML

## (undated) DEVICE — DRAPE IRRIGATION POUCH 19X23"

## (undated) DEVICE — SOL IRR BAG H2O 2000ML

## (undated) DEVICE — VISITEC 4X4

## (undated) DEVICE — WARMING BLANKET UPPER ADULT

## (undated) DEVICE — SUT MONOCRYL 4-0 27" PS-2 UNDYED

## (undated) DEVICE — DRAPE TOWEL BLUE 17" X 24"

## (undated) DEVICE — DRAPE LIGHT HANDLE COVER (GREEN)

## (undated) DEVICE — GLV 7.5 PROTEXIS (WHITE)

## (undated) DEVICE — POSITIONER PURPLE ARM ONE STEP (LARGE)

## (undated) DEVICE — DRSG PAD SANITARY OB

## (undated) DEVICE — BASIN SET DOUBLE

## (undated) DEVICE — POSITIONER FOAM EGG CRATE ULNAR 2PCS (PINK)

## (undated) DEVICE — TUBING OLYMPUS INSUFFLATION

## (undated) DEVICE — PACK GENERAL LAPAROSCOPY

## (undated) DEVICE — TROCAR COVIDIEN VERSAPORT BLADELESS OPTICAL 5MM STANDARD

## (undated) DEVICE — FOLEY TRAY 16FR 5CC LF UMETER CLOSED

## (undated) DEVICE — PROTECTOR HEEL / ELBOW FLUFFY

## (undated) DEVICE — NDL HYPO REGULAR BEVEL 25G X 1.5" (BLUE)

## (undated) DEVICE — SUT VICRYL 0 36" CT-1 UNDYED